# Patient Record
Sex: FEMALE | NOT HISPANIC OR LATINO | URBAN - METROPOLITAN AREA
[De-identification: names, ages, dates, MRNs, and addresses within clinical notes are randomized per-mention and may not be internally consistent; named-entity substitution may affect disease eponyms.]

---

## 2024-11-05 ENCOUNTER — APPOINTMENT (RX ONLY)
Age: 69
Setting detail: DERMATOLOGY
End: 2024-11-05

## 2024-11-05 DIAGNOSIS — D22 MELANOCYTIC NEVI: ICD-10-CM

## 2024-11-05 DIAGNOSIS — B35.1 TINEA UNGUIUM: ICD-10-CM

## 2024-11-05 DIAGNOSIS — L57.0 ACTINIC KERATOSIS: ICD-10-CM

## 2024-11-05 DIAGNOSIS — D485 NEOPLASM OF UNCERTAIN BEHAVIOR OF SKIN: ICD-10-CM

## 2024-11-05 DIAGNOSIS — D18.0 HEMANGIOMA: ICD-10-CM

## 2024-11-05 DIAGNOSIS — L82.0 INFLAMED SEBORRHEIC KERATOSIS: ICD-10-CM

## 2024-11-05 PROBLEM — D18.01 HEMANGIOMA OF SKIN AND SUBCUTANEOUS TISSUE: Status: ACTIVE | Noted: 2024-11-05

## 2024-11-05 PROBLEM — D48.5 NEOPLASM OF UNCERTAIN BEHAVIOR OF SKIN: Status: ACTIVE | Noted: 2024-11-05

## 2024-11-05 PROBLEM — D22.71 MELANOCYTIC NEVI OF RIGHT LOWER LIMB, INCLUDING HIP: Status: ACTIVE | Noted: 2024-11-05

## 2024-11-05 PROCEDURE — ? LIQUID NITROGEN

## 2024-11-05 PROCEDURE — 99203 OFFICE O/P NEW LOW 30 MIN: CPT | Mod: 25

## 2024-11-05 PROCEDURE — ? OBSERVATION

## 2024-11-05 PROCEDURE — ? COUNSELING

## 2024-11-05 PROCEDURE — 11102 TANGNTL BX SKIN SINGLE LES: CPT | Mod: 59

## 2024-11-05 PROCEDURE — 17111 DESTRUCTION B9 LESIONS 15/>: CPT

## 2024-11-05 PROCEDURE — 17000 DESTRUCT PREMALG LESION: CPT | Mod: 59

## 2024-11-05 PROCEDURE — ? BIOPSY BY SHAVE METHOD

## 2024-11-05 PROCEDURE — ? ADDITIONAL NOTES

## 2024-11-05 ASSESSMENT — LOCATION DETAILED DESCRIPTION DERM
LOCATION DETAILED: LEFT CLAVICULAR SKIN
LOCATION DETAILED: RIGHT DORSAL GREAT TOE
LOCATION DETAILED: RIGHT KNEE
LOCATION DETAILED: LEFT ANTERIOR SHOULDER
LOCATION DETAILED: LEFT LATERAL SUPERIOR CHEST
LOCATION DETAILED: LEFT MEDIAL SUPERIOR CHEST
LOCATION DETAILED: LEFT FOREHEAD
LOCATION DETAILED: LEFT POPLITEAL SKIN
LOCATION DETAILED: LEFT POSTAURICULAR SKIN
LOCATION DETAILED: RIGHT LATERAL FOREHEAD
LOCATION DETAILED: RIGHT SUPERIOR FOREHEAD
LOCATION DETAILED: RIGHT LATERAL UPPER BACK
LOCATION DETAILED: LEFT DORSAL GREAT TOE
LOCATION DETAILED: RIGHT FOREHEAD
LOCATION DETAILED: LEFT INFERIOR MEDIAL UPPER BACK
LOCATION DETAILED: LEFT ANTERIOR PROXIMAL THIGH
LOCATION DETAILED: LEFT INFERIOR TEMPLE
LOCATION DETAILED: LEFT DISTAL POSTERIOR THIGH
LOCATION DETAILED: LEFT INFERIOR FOREHEAD
LOCATION DETAILED: LEFT MID-UPPER BACK
LOCATION DETAILED: RIGHT DISTAL POSTERIOR THIGH
LOCATION DETAILED: LEFT SUPERIOR UPPER BACK
LOCATION DETAILED: RIGHT MID-UPPER BACK
LOCATION DETAILED: LEFT VENTRAL PROXIMAL FOREARM
LOCATION DETAILED: PERIUMBILICAL SKIN
LOCATION DETAILED: NASAL DORSUM
LOCATION DETAILED: RIGHT SUPERIOR LATERAL MALAR CHEEK
LOCATION DETAILED: RIGHT SUPERIOR UPPER BACK
LOCATION DETAILED: RIGHT SUPERIOR POSTERIOR NECK
LOCATION DETAILED: RIGHT MID TEMPLE
LOCATION DETAILED: LEFT INFERIOR LATERAL FOREHEAD

## 2024-11-05 ASSESSMENT — LOCATION SIMPLE DESCRIPTION DERM
LOCATION SIMPLE: RIGHT KNEE
LOCATION SIMPLE: RIGHT POSTERIOR THIGH
LOCATION SIMPLE: RIGHT TEMPLE
LOCATION SIMPLE: CHEST
LOCATION SIMPLE: ABDOMEN
LOCATION SIMPLE: POSTERIOR NECK
LOCATION SIMPLE: RIGHT CHEEK
LOCATION SIMPLE: LEFT THIGH
LOCATION SIMPLE: LEFT FOREHEAD
LOCATION SIMPLE: LEFT GREAT TOE
LOCATION SIMPLE: RIGHT UPPER BACK
LOCATION SIMPLE: LEFT FOREARM
LOCATION SIMPLE: LEFT POSTERIOR THIGH
LOCATION SIMPLE: LEFT UPPER BACK
LOCATION SIMPLE: LEFT TEMPLE
LOCATION SIMPLE: POSTERIOR SCALP
LOCATION SIMPLE: NOSE
LOCATION SIMPLE: RIGHT FOREHEAD
LOCATION SIMPLE: LEFT POPLITEAL SKIN
LOCATION SIMPLE: RIGHT GREAT TOE
LOCATION SIMPLE: LEFT SHOULDER
LOCATION SIMPLE: LEFT CLAVICULAR SKIN

## 2024-11-05 ASSESSMENT — LOCATION ZONE DERM
LOCATION ZONE: TRUNK
LOCATION ZONE: LEG
LOCATION ZONE: FACE
LOCATION ZONE: TOE
LOCATION ZONE: ARM
LOCATION ZONE: SCALP
LOCATION ZONE: NECK
LOCATION ZONE: NOSE

## 2024-11-05 NOTE — PROCEDURE: LIQUID NITROGEN
Spray Paint Text: The liquid nitrogen was applied to the skin utilizing a spray paint frosting technique.
Duration Of Freeze Thaw-Cycle (Seconds): 5-10
Post-Care Instructions: I reviewed with the patient in detail post-care instructions. Patient is to wear sunprotection, and avoid picking at any of the treated lesions. Pt may apply Vaseline to crusted or scabbing areas.
Show Spray Paint Technique Variable?: Yes
Pared With?: 15 blade
Application Tool (Optional): Liquid Nitrogen Sprayer
Add 52 Modifier (Optional): no
Medical Necessity Clause: This procedure was medically necessary because the lesions that were treated were:
Consent: The patient's consent was obtained including but not limited to risks of crusting, scabbing, blistering, scarring, darker or lighter pigmentary change, recurrence, incomplete removal and infection.
Number Of Freeze-Thaw Cycles: 3 freeze-thaw cycles
Medical Necessity Information: It is in your best interest to select a reason for this procedure from the list below. All of these items fulfill various CMS LCD requirements except the new and changing color options.
Aperture Size (Optional): C
Detail Level: Detailed
Number Of Freeze-Thaw Cycles: 1 freeze-thaw cycle
Detail Level: Simple
Duration Of Freeze Thaw-Cycle (Seconds): 0

## 2024-11-05 NOTE — PROCEDURE: ADDITIONAL NOTES
Render Risk Assessment In Note?: no
Additional Notes: -Pt reports seeing podiatrist for fungus on toenails\\n-Declines tx at today's visit
Detail Level: Zone
Additional Notes: may need additional treatment

## 2024-11-13 ENCOUNTER — APPOINTMENT (RX ONLY)
Age: 69
Setting detail: DERMATOLOGY
End: 2024-11-13

## 2024-11-13 DIAGNOSIS — Z71.89 OTHER SPECIFIED COUNSELING: ICD-10-CM

## 2024-11-13 PROBLEM — C44.311 BASAL CELL CARCINOMA OF SKIN OF NOSE: Status: ACTIVE | Noted: 2024-11-13

## 2024-11-13 PROCEDURE — 77290 THER RAD SIMULAJ FIELD CPLX: CPT

## 2024-11-13 PROCEDURE — ? ADDITIONAL NOTES

## 2024-11-13 PROCEDURE — ? SUPERFICIAL RADIOTHERAPY: SIMULATION VISIT

## 2024-11-13 PROCEDURE — ? SUPERFICIAL RADIATION TREATMENT

## 2024-11-13 PROCEDURE — 77300 RADIATION THERAPY DOSE PLAN: CPT

## 2024-11-13 NOTE — PROCEDURE: SUPERFICIAL RADIOTHERAPY: SIMULATION VISIT
Bill For Treatment Device Design (98651, 71391, 08657): No
Bill For Simulation: Yes- (Complex or 3+ Sites: 30491)
Bill For Dosimetry/Prescription Creation (51263): Yes
Patient Positioning: Sitting

## 2024-11-13 NOTE — PROCEDURE: SUPERFICIAL RADIATION TREATMENT
Computed Treatment Time In Min (Will Render The Same As Calculated Treatment Time If Left Blank): 0
Bill For Physics Consultation: Yes
Radiation Units: cGy
Prescription Used: 1
Include Rx 4 When Rendering Additional Prescriptions: No
Field Size (Applicator): 1.5 cm
Additional Prescription Justification Text: If there is any interruption in treatment exceeding 5 days please see Decay and Dose Adjustment Calculation and complete treatment under Prescription 2, 3 or 4 as appropriate.
Detail Level: Detailed
Patient Positioning: Supine
Treatment Margins In Cm: 0.5
Port Dimensions-Y Axis In Cm: 1.5
Simple Simulation Preamble Text Will Be Included With Simple Simulations (.......... Indications): Simple simulation was performed today for the following reasons:
Use Automated Fraction Number And Cumulative Dosage?: Yes (Use Enhanced Automation)
Total Rx Dosage (Required): 5472.3
Ultrasound Used Text: Ultrasound was utilized to place radiation therapy fields.
Field Number: [id number]
Fractionation Number (Evaluation): Override
Treatment Device Design After Initial Simulation Justification (Will Render If Bill For Treatment Devices = Yes): The patient is status post radiation simulation and is evaluated as to the use of additional devices for shielding and placement for radiation therapy.
Total Dose (Optional-Please Include Units): 5472.3 cGy
Ultrasound Used Text: Patient has a location which was not amenable to ultrasound.
Daily Dosage Administered For All Fractions (Required): 321.6
Assessment: Appropriate reaction
Treatment Time In Min (Optional): .44
Time Dose Fractionation (Optional- Include Units If Applicable): .44 min
Custom Shielding Afterword Text Will Not Be Included With Simple Simulations (X X Y Cm............): port to correlate with the lesion size, including treatment margin. The custom lead shield is adequate to accommodate the appropriate applicator and provide adequate shielding around the treatment site. Additional shielding (as noted below) is used to protect sensitive, normal tissues.
Energy (Optional-Please Include Units): 50 kV
Please Choose The Type Of Visit (Required): Treatment and Simulation Visit: Show Treatment/Simulation Variables
Simple Simulation Afterword Text Will Be Included With Simple Simulations (Indications............): The patient had a complete consultation regarding all applicable modalities for the treatment of their skin cancer and based on a variety of factors including the type of tumor, size, and location, the relevant medical history as well as local tissue factors, the functional status of the individual, the ability to perform necessary postoperative wound instructions and the need for simultaneous treatments as well as overall wound healing status, it was determined that the patient would begin radiation therapy treatment for skin cancer.  A full simulation and treatment device design was performed including the determination and formulation of appropriate simple and complex devices including lead shield of 0.762 mm thickness to form molded customized shielding to specifically correlate with the lesion size including treatment margin.  The custom lead shield is adequate to accommodate the appropriate applicator and provide adequate shielding around the treatment site.  The specific field applicator, shields, and devices both simple and complex as well as the specific patient setup is outlined below.  The patient was given a full consent for superficial radiation to both verbally and in writing and the full determination of patient's eligibility for treatment and selection is outlined on the patient eligibility and treatment selection form.  The specific superficial radiotherapy prescription was determined and was documented on the superficial radiotherapy prescription form.  A treatment calculation was also performed and documented on the treatment calculation form.  Based on the prescription, the patient was scheduled for a series of fractional treatments.
Total Number Of Fractions: 17
Daily Fractionated Dose (Optional- Include Units): 321.6 cGy
Fractions / Week: 2
Custom Shielding Preamble Text Will Not Be Included With Simple Simulations (.......... X X Y Cm): A lead shield of 0.762 mm thickness is utilized to form a molded, custom shield with a
Bill For Simulation: Yes - (Complex Simulation: 18653)
Body Location Override (Optional): Nasal Dorsum
Treatment Time / Fractionation (Optional- Include Units): 731 cGy/min
Intro Statement (Will Not Render If Left Blank): The patient is undergoing superficial radiation therapy for skin cancer and presents for weekly evaluation and management.  Per protocol and as documented on the flow sheet, the patient was questioned as to subjective redness, pruritus, pain, drainage, fatigue, or any other symptoms.  Objectively, the radiation area was evaluated with regards to erythema, atrophy, scale, crusting, erosion, ulceration, edema, purpura, tenderness, warmth, drainage, and any other findings.  The plan was extensively reviewed including the dose, and dosing schedule.  The simulation and clinical setup was also reviewed as was the external and any internal shields and based on this review the appropriateness and sufficiency of treatment was determined.
Other Assessment (Will Overide Current Assessment): Appropriate for SRT
Information: Selecting Yes will display possible errors in your note based on the variables you have selected. This validation is only offered as a suggestion for you. PLEASE NOTE THAT THE VALIDATION TEXT WILL BE REMOVED WHEN YOU FINALIZE YOUR NOTE. IF YOU WANT TO FAX A PRELIMINARY NOTE YOU WILL NEED TO TOGGLE THIS TO 'NO' IF YOU DO NOT WANT IT IN YOUR FAXED NOTE.
Functional Status: 0 (fully active)
Shielding Size (Optional- Include Units): x cm
Energy Output In Cgy/Min (Optional): 731
Ssd In Cm (Optional): 15

## 2024-11-13 NOTE — PROCEDURE: ADDITIONAL NOTES
Additional Notes: Medicare - I, Dr. Fernando Tran, attest that my ACGME accredited resident/fellow physician at the [facility] residency program and myself personally met with the patient face to face and examined the patient and I developed the plan of care. As the  and primary teaching physician which involves residents in providing care at [facility], I am able to provide the necessary direction, management and review for the resident’s services by direct supervision with the resident and the patient. Incident to services are being performed under the direct supervision of Dr Tran, initiation and continued involvement in treatment is always followed and he is immediately available as he is within the walls of this office during treatment to provide assistance and direction throughout the time the resident or fellow is performing services.
Render Risk Assessment In Note?: no
Detail Level: Simple

## 2024-11-15 ENCOUNTER — APPOINTMENT (RX ONLY)
Age: 69
Setting detail: DERMATOLOGY
End: 2024-11-15

## 2024-11-15 PROBLEM — C44.311 BASAL CELL CARCINOMA OF SKIN OF NOSE: Status: ACTIVE | Noted: 2024-11-15

## 2024-11-15 PROCEDURE — ? SUPERFICIAL RADIATION TREATMENT

## 2024-11-15 PROCEDURE — 77401: CPT

## 2024-11-15 PROCEDURE — 77280 THER RAD SIMULAJ FIELD SMPL: CPT

## 2024-11-15 NOTE — PROCEDURE: SUPERFICIAL RADIATION TREATMENT
Computed Treatment Time In Min (Will Render The Same As Calculated Treatment Time If Left Blank): 0
Bill For Physics Consultation: Yes
Radiation Units: cGy
Prescription Used: 1
Include Rx 4 When Rendering Additional Prescriptions: No
Field Size (Applicator): 1.5 cm
Additional Prescription Justification Text: If there is any interruption in treatment exceeding 5 days please see Decay and Dose Adjustment Calculation and complete treatment under Prescription 2, 3 or 4 as appropriate.
Detail Level: Detailed
Patient Positioning: Supine
Treatment Margins In Cm: 0.5
Date Of Fraction 1: 11/15/2024
Port Dimensions-Y Axis In Cm: 1.5
Simple Simulation Preamble Text Will Be Included With Simple Simulations (.......... Indications): Simple simulation was performed today for the following reasons:
Use Automated Fraction Number And Cumulative Dosage?: Yes (Use Enhanced Automation)
Total Rx Dosage (Required): 5472.3
Ultrasound Used Text: Ultrasound was utilized to place radiation therapy fields.
Field Number: [id number]
Fractionation Number (Evaluation): Override
Treatment Device Design After Initial Simulation Justification (Will Render If Bill For Treatment Devices = Yes): The patient is status post radiation simulation and is evaluated as to the use of additional devices for shielding and placement for radiation therapy.
Total Dose (Optional-Please Include Units): 5472.3 cGy
Ultrasound Used Text: Patient has a location which was not amenable to ultrasound.
Daily Dosage Administered For All Fractions (Required): 321.6
Assessment: Appropriate reaction
Treatment Time In Min (Optional): .44
Time Dose Fractionation (Optional- Include Units If Applicable): .44 min
Custom Shielding Afterword Text Will Not Be Included With Simple Simulations (X X Y Cm............): port to correlate with the lesion size, including treatment margin. The custom lead shield is adequate to accommodate the appropriate applicator and provide adequate shielding around the treatment site. Additional shielding (as noted below) is used to protect sensitive, normal tissues.
Energy (Optional-Please Include Units): 50 kV
Please Choose The Type Of Visit (Required): Treatment and Simulation Visit: Show Treatment/Simulation Variables
Simple Simulation Afterword Text Will Be Included With Simple Simulations (Indications............): The patient had a complete consultation regarding all applicable modalities for the treatment of their skin cancer and based on a variety of factors including the type of tumor, size, and location, the relevant medical history as well as local tissue factors, the functional status of the individual, the ability to perform necessary postoperative wound instructions and the need for simultaneous treatments as well as overall wound healing status, it was determined that the patient would begin radiation therapy treatment for skin cancer.  A full simulation and treatment device design was performed including the determination and formulation of appropriate simple and complex devices including lead shield of 0.762 mm thickness to form molded customized shielding to specifically correlate with the lesion size including treatment margin.  The custom lead shield is adequate to accommodate the appropriate applicator and provide adequate shielding around the treatment site.  The specific field applicator, shields, and devices both simple and complex as well as the specific patient setup is outlined below.  The patient was given a full consent for superficial radiation to both verbally and in writing and the full determination of patient's eligibility for treatment and selection is outlined on the patient eligibility and treatment selection form.  The specific superficial radiotherapy prescription was determined and was documented on the superficial radiotherapy prescription form.  A treatment calculation was also performed and documented on the treatment calculation form.  Based on the prescription, the patient was scheduled for a series of fractional treatments.
Total Number Of Fractions: 17
Daily Fractionated Dose (Optional- Include Units): 321.6 cGy
Fractions / Week: 2
Custom Shielding Preamble Text Will Not Be Included With Simple Simulations (.......... X X Y Cm): A lead shield of 0.762 mm thickness is utilized to form a molded, custom shield with a
Bill For Simulation: Yes - (Simple Simulation: 00215)
Body Location Override (Optional): Nasal Dorsum
Treatment Time / Fractionation (Optional- Include Units): 731 cGy/min
Intro Statement (Will Not Render If Left Blank): The patient is undergoing superficial radiation therapy for skin cancer and presents for weekly evaluation and management.  Per protocol and as documented on the flow sheet, the patient was questioned as to subjective redness, pruritus, pain, drainage, fatigue, or any other symptoms.  Objectively, the radiation area was evaluated with regards to erythema, atrophy, scale, crusting, erosion, ulceration, edema, purpura, tenderness, warmth, drainage, and any other findings.  The plan was extensively reviewed including the dose, and dosing schedule.  The simulation and clinical setup was also reviewed as was the external and any internal shields and based on this review the appropriateness and sufficiency of treatment was determined.
Other Assessment (Will Overide Current Assessment): Appropriate for SRT
Information: Selecting Yes will display possible errors in your note based on the variables you have selected. This validation is only offered as a suggestion for you. PLEASE NOTE THAT THE VALIDATION TEXT WILL BE REMOVED WHEN YOU FINALIZE YOUR NOTE. IF YOU WANT TO FAX A PRELIMINARY NOTE YOU WILL NEED TO TOGGLE THIS TO 'NO' IF YOU DO NOT WANT IT IN YOUR FAXED NOTE.
Functional Status: 0 (fully active)
Shielding Size (Optional- Include Units): x cm
Energy Output In Cgy/Min (Optional): 731
Ssd In Cm (Optional): 15

## 2024-11-18 ENCOUNTER — APPOINTMENT (RX ONLY)
Age: 69
Setting detail: DERMATOLOGY
End: 2024-11-18

## 2024-11-18 DIAGNOSIS — Z71.89 OTHER SPECIFIED COUNSELING: ICD-10-CM

## 2024-11-18 PROBLEM — C44.311 BASAL CELL CARCINOMA OF SKIN OF NOSE: Status: ACTIVE | Noted: 2024-11-18

## 2024-11-18 PROCEDURE — 77401: CPT

## 2024-11-18 PROCEDURE — ? ADDITIONAL NOTES

## 2024-11-18 PROCEDURE — 77280 THER RAD SIMULAJ FIELD SMPL: CPT

## 2024-11-18 PROCEDURE — ? SUPERFICIAL RADIATION TREATMENT

## 2024-11-18 NOTE — PROCEDURE: ADDITIONAL NOTES
Additional Notes: Medicare - I, Dr. Fernando Tran, attest that my ACGME accredited resident/fellow physician at the [facility] residency program and myself personally met with the patient face to face and examined the patient and I developed the plan of care. As the  and primary teaching physician which involves residents in providing care at [facility], I am able to provide the necessary direction, management and review for the resident’s services by direct supervision with the resident and the patient. Incident to services are being performed under the direct supervision of Dr Tran, initiation and continued involvement in treatment is always followed and he is immediately available as he is within the walls of this office during treatment to provide assistance and direction throughout the time the resident or fellow is performing services.
Detail Level: Simple
Render Risk Assessment In Note?: no

## 2024-11-18 NOTE — PROCEDURE: SUPERFICIAL RADIATION TREATMENT
Computed Treatment Time In Min (Will Render The Same As Calculated Treatment Time If Left Blank): 0
Bill For Physics Consultation: Yes
Radiation Units: cGy
Prescription Used: 1
Include Rx 4 When Rendering Additional Prescriptions: No
Field Size (Applicator): 1.5 cm
Additional Prescription Justification Text: If there is any interruption in treatment exceeding 5 days please see Decay and Dose Adjustment Calculation and complete treatment under Prescription 2, 3 or 4 as appropriate.
Detail Level: Detailed
Patient Positioning: Supine
Treatment Margins In Cm: 0.5
Date Of Fraction 1: 11/15/2024
Port Dimensions-Y Axis In Cm: 1.5
Simple Simulation Preamble Text Will Be Included With Simple Simulations (.......... Indications): Simple simulation was performed today for the following reasons:
Use Automated Fraction Number And Cumulative Dosage?: Yes (Use Enhanced Automation)
Total Rx Dosage (Required): 5472.3
Ultrasound Used Text: Ultrasound was utilized to place radiation therapy fields.
Field Number: [id number]
Fractionation Number (Evaluation): Override
Treatment Device Design After Initial Simulation Justification (Will Render If Bill For Treatment Devices = Yes): The patient is status post radiation simulation and is evaluated as to the use of additional devices for shielding and placement for radiation therapy.
Date Of Fraction 2: 11/18/2024
Total Dose (Optional-Please Include Units): 5472.3 cGy
Ultrasound Used Text: Patient has a location which was not amenable to ultrasound.
Daily Dosage Administered For All Fractions (Required): 321.6
Assessment: Appropriate reaction
Treatment Time In Min (Optional): .44
Time Dose Fractionation (Optional- Include Units If Applicable): .44 min
Custom Shielding Afterword Text Will Not Be Included With Simple Simulations (X X Y Cm............): port to correlate with the lesion size, including treatment margin. The custom lead shield is adequate to accommodate the appropriate applicator and provide adequate shielding around the treatment site. Additional shielding (as noted below) is used to protect sensitive, normal tissues.
Energy (Optional-Please Include Units): 50 kV
Please Choose The Type Of Visit (Required): Treatment and Simulation Visit: Show Treatment/Simulation Variables
Simple Simulation Afterword Text Will Be Included With Simple Simulations (Indications............): The patient had a complete consultation regarding all applicable modalities for the treatment of their skin cancer and based on a variety of factors including the type of tumor, size, and location, the relevant medical history as well as local tissue factors, the functional status of the individual, the ability to perform necessary postoperative wound instructions and the need for simultaneous treatments as well as overall wound healing status, it was determined that the patient would begin radiation therapy treatment for skin cancer.  A full simulation and treatment device design was performed including the determination and formulation of appropriate simple and complex devices including lead shield of 0.762 mm thickness to form molded customized shielding to specifically correlate with the lesion size including treatment margin.  The custom lead shield is adequate to accommodate the appropriate applicator and provide adequate shielding around the treatment site.  The specific field applicator, shields, and devices both simple and complex as well as the specific patient setup is outlined below.  The patient was given a full consent for superficial radiation to both verbally and in writing and the full determination of patient's eligibility for treatment and selection is outlined on the patient eligibility and treatment selection form.  The specific superficial radiotherapy prescription was determined and was documented on the superficial radiotherapy prescription form.  A treatment calculation was also performed and documented on the treatment calculation form.  Based on the prescription, the patient was scheduled for a series of fractional treatments.
Total Number Of Fractions: 17
Daily Fractionated Dose (Optional- Include Units): 321.6 cGy
Fractions / Week: 2
Custom Shielding Preamble Text Will Not Be Included With Simple Simulations (.......... X X Y Cm): A lead shield of 0.762 mm thickness is utilized to form a molded, custom shield with a
Bill For Simulation: Yes - (Simple Simulation: 93421)
Body Location Override (Optional): Nasal Dorsum
Treatment Time / Fractionation (Optional- Include Units): 731 cGy/min
Intro Statement (Will Not Render If Left Blank): The patient is undergoing superficial radiation therapy for skin cancer and presents for weekly evaluation and management.  Per protocol and as documented on the flow sheet, the patient was questioned as to subjective redness, pruritus, pain, drainage, fatigue, or any other symptoms.  Objectively, the radiation area was evaluated with regards to erythema, atrophy, scale, crusting, erosion, ulceration, edema, purpura, tenderness, warmth, drainage, and any other findings.  The plan was extensively reviewed including the dose, and dosing schedule.  The simulation and clinical setup was also reviewed as was the external and any internal shields and based on this review the appropriateness and sufficiency of treatment was determined.
Other Assessment (Will Overide Current Assessment): Appropriate for SRT
Information: Selecting Yes will display possible errors in your note based on the variables you have selected. This validation is only offered as a suggestion for you. PLEASE NOTE THAT THE VALIDATION TEXT WILL BE REMOVED WHEN YOU FINALIZE YOUR NOTE. IF YOU WANT TO FAX A PRELIMINARY NOTE YOU WILL NEED TO TOGGLE THIS TO 'NO' IF YOU DO NOT WANT IT IN YOUR FAXED NOTE.
Functional Status: 0 (fully active)
Shielding Size (Optional- Include Units): x cm
Energy Output In Cgy/Min (Optional): 731
Ssd In Cm (Optional): 15

## 2024-11-20 ENCOUNTER — APPOINTMENT (RX ONLY)
Age: 69
Setting detail: DERMATOLOGY
End: 2024-11-20

## 2024-11-20 DIAGNOSIS — Z71.89 OTHER SPECIFIED COUNSELING: ICD-10-CM

## 2024-11-20 PROBLEM — C44.311 BASAL CELL CARCINOMA OF SKIN OF NOSE: Status: ACTIVE | Noted: 2024-11-20

## 2024-11-20 PROCEDURE — ? ADDITIONAL NOTES

## 2024-11-20 PROCEDURE — ? SUPERFICIAL RADIATION TREATMENT

## 2024-11-20 PROCEDURE — 77280 THER RAD SIMULAJ FIELD SMPL: CPT

## 2024-11-20 PROCEDURE — 77401: CPT

## 2024-11-20 NOTE — PROCEDURE: SUPERFICIAL RADIATION TREATMENT
Computed Treatment Time In Min (Will Render The Same As Calculated Treatment Time If Left Blank): 0
Bill For Physics Consultation: Yes
Radiation Units: cGy
Prescription Used: 1
Date Of Fraction 3: 11/20/2024
Include Rx 4 When Rendering Additional Prescriptions: No
Field Size (Applicator): 1.5 cm
Additional Prescription Justification Text: If there is any interruption in treatment exceeding 5 days please see Decay and Dose Adjustment Calculation and complete treatment under Prescription 2, 3 or 4 as appropriate.
Detail Level: Detailed
Patient Positioning: Supine
Treatment Margins In Cm: 0.5
Date Of Fraction 1: 11/15/2024
Port Dimensions-Y Axis In Cm: 1.5
Simple Simulation Preamble Text Will Be Included With Simple Simulations (.......... Indications): Simple simulation was performed today for the following reasons:
Use Automated Fraction Number And Cumulative Dosage?: Yes (Use Enhanced Automation)
Total Rx Dosage (Required): 5472.3
Ultrasound Used Text: Ultrasound was utilized to place radiation therapy fields.
Field Number: [id number]
Fractionation Number (Evaluation): Override
Treatment Device Design After Initial Simulation Justification (Will Render If Bill For Treatment Devices = Yes): The patient is status post radiation simulation and is evaluated as to the use of additional devices for shielding and placement for radiation therapy.
Date Of Fraction 2: 11/18/2024
Total Dose (Optional-Please Include Units): 5472.3 cGy
Ultrasound Used Text: Patient has a location which was not amenable to ultrasound.
Daily Dosage Administered For All Fractions (Required): 321.6
Assessment: Appropriate reaction
Treatment Time In Min (Optional): .44
Time Dose Fractionation (Optional- Include Units If Applicable): .44 min
Custom Shielding Afterword Text Will Not Be Included With Simple Simulations (X X Y Cm............): port to correlate with the lesion size, including treatment margin. The custom lead shield is adequate to accommodate the appropriate applicator and provide adequate shielding around the treatment site. Additional shielding (as noted below) is used to protect sensitive, normal tissues.
Energy (Optional-Please Include Units): 50 kV
Please Choose The Type Of Visit (Required): Treatment and Simulation Visit: Show Treatment/Simulation Variables
Simple Simulation Afterword Text Will Be Included With Simple Simulations (Indications............): The patient had a complete consultation regarding all applicable modalities for the treatment of their skin cancer and based on a variety of factors including the type of tumor, size, and location, the relevant medical history as well as local tissue factors, the functional status of the individual, the ability to perform necessary postoperative wound instructions and the need for simultaneous treatments as well as overall wound healing status, it was determined that the patient would begin radiation therapy treatment for skin cancer.  A full simulation and treatment device design was performed including the determination and formulation of appropriate simple and complex devices including lead shield of 0.762 mm thickness to form molded customized shielding to specifically correlate with the lesion size including treatment margin.  The custom lead shield is adequate to accommodate the appropriate applicator and provide adequate shielding around the treatment site.  The specific field applicator, shields, and devices both simple and complex as well as the specific patient setup is outlined below.  The patient was given a full consent for superficial radiation to both verbally and in writing and the full determination of patient's eligibility for treatment and selection is outlined on the patient eligibility and treatment selection form.  The specific superficial radiotherapy prescription was determined and was documented on the superficial radiotherapy prescription form.  A treatment calculation was also performed and documented on the treatment calculation form.  Based on the prescription, the patient was scheduled for a series of fractional treatments.
Total Number Of Fractions: 17
Daily Fractionated Dose (Optional- Include Units): 321.6 cGy
Fractions / Week: 2
Custom Shielding Preamble Text Will Not Be Included With Simple Simulations (.......... X X Y Cm): A lead shield of 0.762 mm thickness is utilized to form a molded, custom shield with a
Bill For Simulation: Yes - (Simple Simulation: 27979)
Body Location Override (Optional): Nasal Dorsum
Treatment Time / Fractionation (Optional- Include Units): 731 cGy/min
Intro Statement (Will Not Render If Left Blank): The patient is undergoing superficial radiation therapy for skin cancer and presents for weekly evaluation and management.  Per protocol and as documented on the flow sheet, the patient was questioned as to subjective redness, pruritus, pain, drainage, fatigue, or any other symptoms.  Objectively, the radiation area was evaluated with regards to erythema, atrophy, scale, crusting, erosion, ulceration, edema, purpura, tenderness, warmth, drainage, and any other findings.  The plan was extensively reviewed including the dose, and dosing schedule.  The simulation and clinical setup was also reviewed as was the external and any internal shields and based on this review the appropriateness and sufficiency of treatment was determined.
Other Assessment (Will Overide Current Assessment): Appropriate for SRT
Information: Selecting Yes will display possible errors in your note based on the variables you have selected. This validation is only offered as a suggestion for you. PLEASE NOTE THAT THE VALIDATION TEXT WILL BE REMOVED WHEN YOU FINALIZE YOUR NOTE. IF YOU WANT TO FAX A PRELIMINARY NOTE YOU WILL NEED TO TOGGLE THIS TO 'NO' IF YOU DO NOT WANT IT IN YOUR FAXED NOTE.
Functional Status: 0 (fully active)
Shielding Size (Optional- Include Units): x cm
Energy Output In Cgy/Min (Optional): 731
Ssd In Cm (Optional): 15

## 2024-11-20 NOTE — PROCEDURE: ADDITIONAL NOTES
Additional Notes: Medicare - I, Dr. Fernando Tran, attest that my ACG[employer] accredited resident/fellow physician at the [facility] residency program and myself personally met with the patient face to face and examined the patient and I developed the plan of care. As the  and primary teaching physician which involves residents in providing care at [facility], I am able to provide the necessary direction, management and review for the resident’s services by direct supervision with the resident and the patient. Incident to services are being performed under the direct supervision of Dr Tran, initiation and continued involvement in treatment is always followed and he is immediately available as he is within the walls of this office during treatment to provide assistance and direction throughout the time the resident or fellow is performing services.
Detail Level: Simple
Render Risk Assessment In Note?: no

## 2024-11-25 ENCOUNTER — APPOINTMENT (RX ONLY)
Age: 69
Setting detail: DERMATOLOGY
End: 2024-11-25

## 2024-11-25 DIAGNOSIS — Z71.89 OTHER SPECIFIED COUNSELING: ICD-10-CM

## 2024-11-25 PROBLEM — C44.311 BASAL CELL CARCINOMA OF SKIN OF NOSE: Status: ACTIVE | Noted: 2024-11-25

## 2024-11-25 PROCEDURE — 77401: CPT

## 2024-11-25 PROCEDURE — 77280 THER RAD SIMULAJ FIELD SMPL: CPT

## 2024-11-25 PROCEDURE — ? SUPERFICIAL RADIATION TREATMENT

## 2024-11-25 PROCEDURE — ? ADDITIONAL NOTES

## 2024-11-25 NOTE — PROCEDURE: ADDITIONAL NOTES
Additional Notes: Medicare - I, Dr. David Benson, attest that my ACGME accredited resident/fellow physician at the [facility] residency program and myself personally met with the patient face to face and examined the patient and I developed the plan of care. As a teaching physician which involves residents in providing care at [facility], I am able to provide the necessary direction, management and review for the resident’s services by direct supervision with the resident and the patient. Incident to services are being performed under the direct supervision of Dr. Benson, initiation and continued involvement in treatment is always followed and he is immediately available as he is within the walls of this office during treatment to provide assistance and direction throughout the time the resident or fellow is performing services.
Detail Level: Generalized
Render Risk Assessment In Note?: no

## 2024-11-25 NOTE — PROCEDURE: SUPERFICIAL RADIATION TREATMENT
Computed Treatment Time In Min (Will Render The Same As Calculated Treatment Time If Left Blank): 0
Bill For Physics Consultation: Yes
Radiation Units: cGy
Prescription Used: 1
Date Of Fraction 3: 11/20/2024
Include Rx 4 When Rendering Additional Prescriptions: No
Field Size (Applicator): 1.5 cm
Additional Prescription Justification Text: If there is any interruption in treatment exceeding 5 days please see Decay and Dose Adjustment Calculation and complete treatment under Prescription 2, 3 or 4 as appropriate.
Detail Level: Detailed
Patient Positioning: Supine
Treatment Margins In Cm: 0.5
Date Of Fraction 1: 11/15/2024
Port Dimensions-Y Axis In Cm: 1.5
Simple Simulation Preamble Text Will Be Included With Simple Simulations (.......... Indications): Simple simulation was performed today for the following reasons:
Use Automated Fraction Number And Cumulative Dosage?: Yes (Use Enhanced Automation)
Total Rx Dosage (Required): 5472.3
Ultrasound Used Text: Ultrasound was utilized to place radiation therapy fields.
Field Number: [id number]
Fractionation Number (Evaluation): Override
Treatment Device Design After Initial Simulation Justification (Will Render If Bill For Treatment Devices = Yes): The patient is status post radiation simulation and is evaluated as to the use of additional devices for shielding and placement for radiation therapy.
Date Of Fraction 2: 11/18/2024
Date Of Fraction 4: 11/25/2024
Total Dose (Optional-Please Include Units): 5472.3 cGy
Ultrasound Used Text: Patient has a location which was not amenable to ultrasound.
Daily Dosage Administered For All Fractions (Required): 321.6
Assessment: Appropriate reaction
Treatment Time In Min (Optional): .44
Time Dose Fractionation (Optional- Include Units If Applicable): .44 min
Custom Shielding Afterword Text Will Not Be Included With Simple Simulations (X X Y Cm............): port to correlate with the lesion size, including treatment margin. The custom lead shield is adequate to accommodate the appropriate applicator and provide adequate shielding around the treatment site. Additional shielding (as noted below) is used to protect sensitive, normal tissues.
Energy (Optional-Please Include Units): 50 kV
Please Choose The Type Of Visit (Required): Treatment and Simulation Visit: Show Treatment/Simulation Variables
Simple Simulation Afterword Text Will Be Included With Simple Simulations (Indications............): The patient had a complete consultation regarding all applicable modalities for the treatment of their skin cancer and based on a variety of factors including the type of tumor, size, and location, the relevant medical history as well as local tissue factors, the functional status of the individual, the ability to perform necessary postoperative wound instructions and the need for simultaneous treatments as well as overall wound healing status, it was determined that the patient would begin radiation therapy treatment for skin cancer.  A full simulation and treatment device design was performed including the determination and formulation of appropriate simple and complex devices including lead shield of 0.762 mm thickness to form molded customized shielding to specifically correlate with the lesion size including treatment margin.  The custom lead shield is adequate to accommodate the appropriate applicator and provide adequate shielding around the treatment site.  The specific field applicator, shields, and devices both simple and complex as well as the specific patient setup is outlined below.  The patient was given a full consent for superficial radiation to both verbally and in writing and the full determination of patient's eligibility for treatment and selection is outlined on the patient eligibility and treatment selection form.  The specific superficial radiotherapy prescription was determined and was documented on the superficial radiotherapy prescription form.  A treatment calculation was also performed and documented on the treatment calculation form.  Based on the prescription, the patient was scheduled for a series of fractional treatments.
Total Number Of Fractions: 17
Daily Fractionated Dose (Optional- Include Units): 321.6 cGy
Fractions / Week: 2
Custom Shielding Preamble Text Will Not Be Included With Simple Simulations (.......... X X Y Cm): A lead shield of 0.762 mm thickness is utilized to form a molded, custom shield with a
Bill For Simulation: Yes - (Simple Simulation: 33107)
Body Location Override (Optional): Nasal Dorsum
Treatment Time / Fractionation (Optional- Include Units): 731 cGy/min
Intro Statement (Will Not Render If Left Blank): The patient is undergoing superficial radiation therapy for skin cancer and presents for weekly evaluation and management.  Per protocol and as documented on the flow sheet, the patient was questioned as to subjective redness, pruritus, pain, drainage, fatigue, or any other symptoms.  Objectively, the radiation area was evaluated with regards to erythema, atrophy, scale, crusting, erosion, ulceration, edema, purpura, tenderness, warmth, drainage, and any other findings.  The plan was extensively reviewed including the dose, and dosing schedule.  The simulation and clinical setup was also reviewed as was the external and any internal shields and based on this review the appropriateness and sufficiency of treatment was determined.
Other Assessment (Will Overide Current Assessment): Appropriate for SRT
Information: Selecting Yes will display possible errors in your note based on the variables you have selected. This validation is only offered as a suggestion for you. PLEASE NOTE THAT THE VALIDATION TEXT WILL BE REMOVED WHEN YOU FINALIZE YOUR NOTE. IF YOU WANT TO FAX A PRELIMINARY NOTE YOU WILL NEED TO TOGGLE THIS TO 'NO' IF YOU DO NOT WANT IT IN YOUR FAXED NOTE.
Functional Status: 0 (fully active)
Shielding Size (Optional- Include Units): x cm
Energy Output In Cgy/Min (Optional): 731
Ssd In Cm (Optional): 15

## 2024-11-27 ENCOUNTER — APPOINTMENT (RX ONLY)
Age: 69
Setting detail: DERMATOLOGY
End: 2024-11-27

## 2024-11-27 DIAGNOSIS — Z71.89 OTHER SPECIFIED COUNSELING: ICD-10-CM

## 2024-11-27 PROBLEM — C44.311 BASAL CELL CARCINOMA OF SKIN OF NOSE: Status: ACTIVE | Noted: 2024-11-27

## 2024-11-27 PROCEDURE — ? ADDITIONAL NOTES

## 2024-11-27 PROCEDURE — 77280 THER RAD SIMULAJ FIELD SMPL: CPT

## 2024-11-27 PROCEDURE — 77401: CPT

## 2024-11-27 PROCEDURE — ? SUPERFICIAL RADIATION TREATMENT

## 2024-11-27 NOTE — PROCEDURE: SUPERFICIAL RADIATION TREATMENT
Date Of Fraction 5: 11/27/2024
Computed Treatment Time In Min (Will Render The Same As Calculated Treatment Time If Left Blank): 0
Bill For Physics Consultation: Yes
Radiation Units: cGy
Prescription Used: 1
Date Of Fraction 3: 11/20/2024
Include Rx 4 When Rendering Additional Prescriptions: No
Field Size (Applicator): 1.5 cm
Additional Prescription Justification Text: If there is any interruption in treatment exceeding 5 days please see Decay and Dose Adjustment Calculation and complete treatment under Prescription 2, 3 or 4 as appropriate.
Detail Level: Detailed
Patient Positioning: Supine
Treatment Margins In Cm: 0.5
Date Of Fraction 1: 11/15/2024
Port Dimensions-Y Axis In Cm: 1.5
Simple Simulation Preamble Text Will Be Included With Simple Simulations (.......... Indications): Simple simulation was performed today for the following reasons:
Use Automated Fraction Number And Cumulative Dosage?: Yes (Use Enhanced Automation)
Total Rx Dosage (Required): 5472.3
Ultrasound Used Text: Ultrasound was utilized to place radiation therapy fields.
Field Number: [id number]
Fractionation Number (Evaluation): Override
Treatment Device Design After Initial Simulation Justification (Will Render If Bill For Treatment Devices = Yes): The patient is status post radiation simulation and is evaluated as to the use of additional devices for shielding and placement for radiation therapy.
Date Of Fraction 2: 11/18/2024
Date Of Fraction 4: 11/25/2024
Total Dose (Optional-Please Include Units): 5472.3 cGy
Ultrasound Used Text: Patient has a location which was not amenable to ultrasound.
Daily Dosage Administered For All Fractions (Required): 321.6
Assessment: Appropriate reaction
Treatment Time In Min (Optional): .44
Time Dose Fractionation (Optional- Include Units If Applicable): .44 min
Custom Shielding Afterword Text Will Not Be Included With Simple Simulations (X X Y Cm............): port to correlate with the lesion size, including treatment margin. The custom lead shield is adequate to accommodate the appropriate applicator and provide adequate shielding around the treatment site. Additional shielding (as noted below) is used to protect sensitive, normal tissues.
Energy (Optional-Please Include Units): 50 kV
Please Choose The Type Of Visit (Required): Treatment and Simulation Visit: Show Treatment/Simulation Variables
Simple Simulation Afterword Text Will Be Included With Simple Simulations (Indications............): The patient had a complete consultation regarding all applicable modalities for the treatment of their skin cancer and based on a variety of factors including the type of tumor, size, and location, the relevant medical history as well as local tissue factors, the functional status of the individual, the ability to perform necessary postoperative wound instructions and the need for simultaneous treatments as well as overall wound healing status, it was determined that the patient would begin radiation therapy treatment for skin cancer.  A full simulation and treatment device design was performed including the determination and formulation of appropriate simple and complex devices including lead shield of 0.762 mm thickness to form molded customized shielding to specifically correlate with the lesion size including treatment margin.  The custom lead shield is adequate to accommodate the appropriate applicator and provide adequate shielding around the treatment site.  The specific field applicator, shields, and devices both simple and complex as well as the specific patient setup is outlined below.  The patient was given a full consent for superficial radiation to both verbally and in writing and the full determination of patient's eligibility for treatment and selection is outlined on the patient eligibility and treatment selection form.  The specific superficial radiotherapy prescription was determined and was documented on the superficial radiotherapy prescription form.  A treatment calculation was also performed and documented on the treatment calculation form.  Based on the prescription, the patient was scheduled for a series of fractional treatments.
Total Number Of Fractions: 17
Daily Fractionated Dose (Optional- Include Units): 321.6 cGy
Fractions / Week: 2
Custom Shielding Preamble Text Will Not Be Included With Simple Simulations (.......... X X Y Cm): A lead shield of 0.762 mm thickness is utilized to form a molded, custom shield with a
Bill For Simulation: Yes - (Simple Simulation: 25035)
Body Location Override (Optional): Nasal Dorsum
Treatment Time / Fractionation (Optional- Include Units): 731 cGy/min
Intro Statement (Will Not Render If Left Blank): The patient is undergoing superficial radiation therapy for skin cancer and presents for weekly evaluation and management.  Per protocol and as documented on the flow sheet, the patient was questioned as to subjective redness, pruritus, pain, drainage, fatigue, or any other symptoms.  Objectively, the radiation area was evaluated with regards to erythema, atrophy, scale, crusting, erosion, ulceration, edema, purpura, tenderness, warmth, drainage, and any other findings.  The plan was extensively reviewed including the dose, and dosing schedule.  The simulation and clinical setup was also reviewed as was the external and any internal shields and based on this review the appropriateness and sufficiency of treatment was determined.
Other Assessment (Will Overide Current Assessment): Appropriate for SRT
Information: Selecting Yes will display possible errors in your note based on the variables you have selected. This validation is only offered as a suggestion for you. PLEASE NOTE THAT THE VALIDATION TEXT WILL BE REMOVED WHEN YOU FINALIZE YOUR NOTE. IF YOU WANT TO FAX A PRELIMINARY NOTE YOU WILL NEED TO TOGGLE THIS TO 'NO' IF YOU DO NOT WANT IT IN YOUR FAXED NOTE.
Functional Status: 0 (fully active)
Shielding Size (Optional- Include Units): x cm
Energy Output In Cgy/Min (Optional): 731
Ssd In Cm (Optional): 15

## 2024-11-27 NOTE — PROCEDURE: ADDITIONAL NOTES
Additional Notes: Medicare - I, Dr. David Benson, attest that my ACGME accredited resident/fellow physician at the [facility] residency program and myself personally met with the patient face to face and examined the patient and I developed the plan of care. As a teaching physician which involves residents in providing care at [facility], I am able to provide the necessary direction, management and review for the resident’s services by direct supervision with the resident and the patient. Incident to services are being performed under the direct supervision of Dr. Benson, initiation and continued involvement in treatment is always followed and he is immediately available as he is within the walls of this office during treatment to provide assistance and direction throughout the time the resident or fellow is performing services.
Detail Level: Generalized
Render Risk Assessment In Note?: no

## 2024-12-03 ENCOUNTER — APPOINTMENT (OUTPATIENT)
Age: 69
Setting detail: DERMATOLOGY
End: 2024-12-03

## 2024-12-03 DIAGNOSIS — Z71.89 OTHER SPECIFIED COUNSELING: ICD-10-CM

## 2024-12-03 PROBLEM — C44.311 BASAL CELL CARCINOMA OF SKIN OF NOSE: Status: ACTIVE | Noted: 2024-12-03

## 2024-12-03 PROCEDURE — ? ADDITIONAL NOTES

## 2024-12-03 PROCEDURE — 77280 THER RAD SIMULAJ FIELD SMPL: CPT

## 2024-12-03 PROCEDURE — 77401: CPT

## 2024-12-03 PROCEDURE — ? SUPERFICIAL RADIATION TREATMENT

## 2024-12-03 NOTE — PROCEDURE: ADDITIONAL NOTES
Render Risk Assessment In Note?: no
Detail Level: Simple
Additional Notes: Medicare - I, Dr. Fernando Tran, attest that my [employer]CG[employer] accredited resident/fellow physician at the [facility] residency program and myself personally met with the patient face to face and examined the patient and I developed the plan of care. As the  and primary teaching physician which involves residents in providing care at [facility], I am able to provide the necessary direction, management and review for the resident’s services by direct supervision with the resident and the patient. Incident to services are being performed under the direct supervision of Dr Tran, initiation and continued involvement in treatment is always followed and he is immediately available as he is within the walls of this office during treatment to provide assistance and direction throughout the time the resident or fellow is performing services.

## 2024-12-03 NOTE — PROCEDURE: SUPERFICIAL RADIATION TREATMENT
Date Of Fraction 5: 11/27/2024
Computed Treatment Time In Min (Will Render The Same As Calculated Treatment Time If Left Blank): 0
Bill For Physics Consultation: Yes
Radiation Units: cGy
Prescription Used: 1
Date Of Fraction 3: 11/20/2024
Include Rx 4 When Rendering Additional Prescriptions: No
Field Size (Applicator): 1.5 cm
Additional Prescription Justification Text: If there is any interruption in treatment exceeding 5 days please see Decay and Dose Adjustment Calculation and complete treatment under Prescription 2, 3 or 4 as appropriate.
Detail Level: Detailed
Patient Positioning: Supine
Treatment Margins In Cm: 0.5
Date Of Fraction 1: 11/15/2024
Port Dimensions-Y Axis In Cm: 1.5
Simple Simulation Preamble Text Will Be Included With Simple Simulations (.......... Indications): Simple simulation was performed today for the following reasons:
Use Automated Fraction Number And Cumulative Dosage?: Yes (Use Enhanced Automation)
Total Rx Dosage (Required): 5472.3
Ultrasound Used Text: Ultrasound was utilized to place radiation therapy fields.
Field Number: [id number]
Fractionation Number (Evaluation): Override
Treatment Device Design After Initial Simulation Justification (Will Render If Bill For Treatment Devices = Yes): The patient is status post radiation simulation and is evaluated as to the use of additional devices for shielding and placement for radiation therapy.
Date Of Fraction 2: 11/18/2024
Date Of Fraction 4: 11/25/2024
Total Dose (Optional-Please Include Units): 5472.3 cGy
Ultrasound Used Text: Patient has a location which was not amenable to ultrasound.
Daily Dosage Administered For All Fractions (Required): 321.6
Assessment: Appropriate reaction
Treatment Time In Min (Optional): .44
Time Dose Fractionation (Optional- Include Units If Applicable): .44 min
Custom Shielding Afterword Text Will Not Be Included With Simple Simulations (X X Y Cm............): port to correlate with the lesion size, including treatment margin. The custom lead shield is adequate to accommodate the appropriate applicator and provide adequate shielding around the treatment site. Additional shielding (as noted below) is used to protect sensitive, normal tissues.
Energy (Optional-Please Include Units): 50 kV
Please Choose The Type Of Visit (Required): Treatment and Simulation Visit: Show Treatment/Simulation Variables
Simple Simulation Afterword Text Will Be Included With Simple Simulations (Indications............): The patient had a complete consultation regarding all applicable modalities for the treatment of their skin cancer and based on a variety of factors including the type of tumor, size, and location, the relevant medical history as well as local tissue factors, the functional status of the individual, the ability to perform necessary postoperative wound instructions and the need for simultaneous treatments as well as overall wound healing status, it was determined that the patient would begin radiation therapy treatment for skin cancer.  A full simulation and treatment device design was performed including the determination and formulation of appropriate simple and complex devices including lead shield of 0.762 mm thickness to form molded customized shielding to specifically correlate with the lesion size including treatment margin.  The custom lead shield is adequate to accommodate the appropriate applicator and provide adequate shielding around the treatment site.  The specific field applicator, shields, and devices both simple and complex as well as the specific patient setup is outlined below.  The patient was given a full consent for superficial radiation to both verbally and in writing and the full determination of patient's eligibility for treatment and selection is outlined on the patient eligibility and treatment selection form.  The specific superficial radiotherapy prescription was determined and was documented on the superficial radiotherapy prescription form.  A treatment calculation was also performed and documented on the treatment calculation form.  Based on the prescription, the patient was scheduled for a series of fractional treatments.
Total Number Of Fractions: 17
Daily Fractionated Dose (Optional- Include Units): 321.6 cGy
Fractions / Week: 2
Custom Shielding Preamble Text Will Not Be Included With Simple Simulations (.......... X X Y Cm): A lead shield of 0.762 mm thickness is utilized to form a molded, custom shield with a
Bill For Simulation: Yes - (Simple Simulation: 49754)
Body Location Override (Optional): Nasal Dorsum
Treatment Time / Fractionation (Optional- Include Units): 731 cGy/min
Intro Statement (Will Not Render If Left Blank): The patient is undergoing superficial radiation therapy for skin cancer and presents for weekly evaluation and management.  Per protocol and as documented on the flow sheet, the patient was questioned as to subjective redness, pruritus, pain, drainage, fatigue, or any other symptoms.  Objectively, the radiation area was evaluated with regards to erythema, atrophy, scale, crusting, erosion, ulceration, edema, purpura, tenderness, warmth, drainage, and any other findings.  The plan was extensively reviewed including the dose, and dosing schedule.  The simulation and clinical setup was also reviewed as was the external and any internal shields and based on this review the appropriateness and sufficiency of treatment was determined.
Other Assessment (Will Overide Current Assessment): Appropriate for SRT
Information: Selecting Yes will display possible errors in your note based on the variables you have selected. This validation is only offered as a suggestion for you. PLEASE NOTE THAT THE VALIDATION TEXT WILL BE REMOVED WHEN YOU FINALIZE YOUR NOTE. IF YOU WANT TO FAX A PRELIMINARY NOTE YOU WILL NEED TO TOGGLE THIS TO 'NO' IF YOU DO NOT WANT IT IN YOUR FAXED NOTE.
Functional Status: 0 (fully active)
Shielding Size (Optional- Include Units): x cm
Energy Output In Cgy/Min (Optional): 731
Ssd In Cm (Optional): 15
Date Of Fraction 6: 12/03/2024

## 2024-12-05 ENCOUNTER — APPOINTMENT (OUTPATIENT)
Age: 69
Setting detail: DERMATOLOGY
End: 2024-12-05

## 2024-12-05 DIAGNOSIS — Z71.89 OTHER SPECIFIED COUNSELING: ICD-10-CM

## 2024-12-05 PROBLEM — C44.311 BASAL CELL CARCINOMA OF SKIN OF NOSE: Status: ACTIVE | Noted: 2024-12-05

## 2024-12-05 PROCEDURE — ? ADDITIONAL NOTES

## 2024-12-05 PROCEDURE — 77401: CPT

## 2024-12-05 PROCEDURE — 77280 THER RAD SIMULAJ FIELD SMPL: CPT

## 2024-12-05 PROCEDURE — ? SUPERFICIAL RADIATION TREATMENT

## 2024-12-05 NOTE — PROCEDURE: SUPERFICIAL RADIATION TREATMENT
Date Of Fraction 5: 11/27/2024
Computed Treatment Time In Min (Will Render The Same As Calculated Treatment Time If Left Blank): 0
Bill For Physics Consultation: Yes
Radiation Units: cGy
Prescription Used: 1
Date Of Fraction 3: 11/20/2024
Include Rx 4 When Rendering Additional Prescriptions: No
Field Size (Applicator): 1.5 cm
Additional Prescription Justification Text: If there is any interruption in treatment exceeding 5 days please see Decay and Dose Adjustment Calculation and complete treatment under Prescription 2, 3 or 4 as appropriate.
Detail Level: Detailed
Patient Positioning: Supine
Treatment Margins In Cm: 0.5
Date Of Fraction 1: 11/15/2024
Port Dimensions-Y Axis In Cm: 1.5
Simple Simulation Preamble Text Will Be Included With Simple Simulations (.......... Indications): Simple simulation was performed today for the following reasons:
Use Automated Fraction Number And Cumulative Dosage?: Yes (Use Enhanced Automation)
Total Rx Dosage (Required): 5472.3
Ultrasound Used Text: Ultrasound was utilized to place radiation therapy fields.
Field Number: [id number]
Fractionation Number (Evaluation): Override
Treatment Device Design After Initial Simulation Justification (Will Render If Bill For Treatment Devices = Yes): The patient is status post radiation simulation and is evaluated as to the use of additional devices for shielding and placement for radiation therapy.
Date Of Fraction 2: 11/18/2024
Date Of Fraction 4: 11/25/2024
Total Dose (Optional-Please Include Units): 5472.3 cGy
Ultrasound Used Text: Patient has a location which was not amenable to ultrasound.
Daily Dosage Administered For All Fractions (Required): 321.6
Assessment: Appropriate reaction
Treatment Time In Min (Optional): .44
Time Dose Fractionation (Optional- Include Units If Applicable): .44 min
Custom Shielding Afterword Text Will Not Be Included With Simple Simulations (X X Y Cm............): port to correlate with the lesion size, including treatment margin. The custom lead shield is adequate to accommodate the appropriate applicator and provide adequate shielding around the treatment site. Additional shielding (as noted below) is used to protect sensitive, normal tissues.
Energy (Optional-Please Include Units): 50 kV
Please Choose The Type Of Visit (Required): Treatment and Simulation Visit: Show Treatment/Simulation Variables
Simple Simulation Afterword Text Will Be Included With Simple Simulations (Indications............): The patient had a complete consultation regarding all applicable modalities for the treatment of their skin cancer and based on a variety of factors including the type of tumor, size, and location, the relevant medical history as well as local tissue factors, the functional status of the individual, the ability to perform necessary postoperative wound instructions and the need for simultaneous treatments as well as overall wound healing status, it was determined that the patient would begin radiation therapy treatment for skin cancer.  A full simulation and treatment device design was performed including the determination and formulation of appropriate simple and complex devices including lead shield of 0.762 mm thickness to form molded customized shielding to specifically correlate with the lesion size including treatment margin.  The custom lead shield is adequate to accommodate the appropriate applicator and provide adequate shielding around the treatment site.  The specific field applicator, shields, and devices both simple and complex as well as the specific patient setup is outlined below.  The patient was given a full consent for superficial radiation to both verbally and in writing and the full determination of patient's eligibility for treatment and selection is outlined on the patient eligibility and treatment selection form.  The specific superficial radiotherapy prescription was determined and was documented on the superficial radiotherapy prescription form.  A treatment calculation was also performed and documented on the treatment calculation form.  Based on the prescription, the patient was scheduled for a series of fractional treatments.
Total Number Of Fractions: 17
Daily Fractionated Dose (Optional- Include Units): 321.6 cGy
Fractions / Week: 2
Date Of Fraction 7: 12/05/2024
Custom Shielding Preamble Text Will Not Be Included With Simple Simulations (.......... X X Y Cm): A lead shield of 0.762 mm thickness is utilized to form a molded, custom shield with a
Bill For Simulation: Yes - (Simple Simulation: 34937)
Body Location Override (Optional): Nasal Dorsum
Treatment Time / Fractionation (Optional- Include Units): 731 cGy/min
Intro Statement (Will Not Render If Left Blank): The patient is undergoing superficial radiation therapy for skin cancer and presents for weekly evaluation and management.  Per protocol and as documented on the flow sheet, the patient was questioned as to subjective redness, pruritus, pain, drainage, fatigue, or any other symptoms.  Objectively, the radiation area was evaluated with regards to erythema, atrophy, scale, crusting, erosion, ulceration, edema, purpura, tenderness, warmth, drainage, and any other findings.  The plan was extensively reviewed including the dose, and dosing schedule.  The simulation and clinical setup was also reviewed as was the external and any internal shields and based on this review the appropriateness and sufficiency of treatment was determined.
Other Assessment (Will Overide Current Assessment): Appropriate for SRT
Information: Selecting Yes will display possible errors in your note based on the variables you have selected. This validation is only offered as a suggestion for you. PLEASE NOTE THAT THE VALIDATION TEXT WILL BE REMOVED WHEN YOU FINALIZE YOUR NOTE. IF YOU WANT TO FAX A PRELIMINARY NOTE YOU WILL NEED TO TOGGLE THIS TO 'NO' IF YOU DO NOT WANT IT IN YOUR FAXED NOTE.
Functional Status: 0 (fully active)
Shielding Size (Optional- Include Units): x cm
Energy Output In Cgy/Min (Optional): 731
Ssd In Cm (Optional): 15
Date Of Fraction 6: 12/03/2024

## 2024-12-05 NOTE — PROCEDURE: ADDITIONAL NOTES
Render Risk Assessment In Note?: no
Detail Level: Simple
Additional Notes: Medicare - I, Dr. Fernando Tran, attest that my ACGME accredited resident/fellow physician at the [facility] residency program and myself personally met with the patient face to face and examined the patient and I developed the plan of care. As the  and primary teaching physician which involves residents in providing care at [facility], I am able to provide the necessary direction, management and review for the resident’s services by direct supervision with the resident and the patient. Incident to services are being performed under the direct supervision of Dr Tran, initiation and continued involvement in treatment is always followed and he is immediately available as he is within the walls of this office during treatment to provide assistance and direction throughout the time the resident or fellow is performing services.

## 2024-12-10 ENCOUNTER — APPOINTMENT (OUTPATIENT)
Age: 69
Setting detail: DERMATOLOGY
End: 2024-12-10

## 2024-12-10 DIAGNOSIS — Z71.89 OTHER SPECIFIED COUNSELING: ICD-10-CM

## 2024-12-10 PROBLEM — C44.311 BASAL CELL CARCINOMA OF SKIN OF NOSE: Status: ACTIVE | Noted: 2024-12-10

## 2024-12-10 PROCEDURE — ? ADDITIONAL NOTES

## 2024-12-10 PROCEDURE — 77401: CPT

## 2024-12-10 PROCEDURE — 77280 THER RAD SIMULAJ FIELD SMPL: CPT

## 2024-12-10 PROCEDURE — ? SUPERFICIAL RADIATION TREATMENT

## 2024-12-10 NOTE — PROCEDURE: SUPERFICIAL RADIATION TREATMENT
Date Of Fraction 5: 11/27/2024
Computed Treatment Time In Min (Will Render The Same As Calculated Treatment Time If Left Blank): 0
Bill For Physics Consultation: Yes
Radiation Units: cGy
Prescription Used: 1
Date Of Fraction 3: 11/20/2024
Include Rx 4 When Rendering Additional Prescriptions: No
Field Size (Applicator): 1.5 cm
Additional Prescription Justification Text: If there is any interruption in treatment exceeding 5 days please see Decay and Dose Adjustment Calculation and complete treatment under Prescription 2, 3 or 4 as appropriate.
Detail Level: Detailed
Patient Positioning: Supine
Treatment Margins In Cm: 0.5
Date Of Fraction 1: 11/15/2024
Port Dimensions-Y Axis In Cm: 1.5
Simple Simulation Preamble Text Will Be Included With Simple Simulations (.......... Indications): Simple simulation was performed today for the following reasons:
Use Automated Fraction Number And Cumulative Dosage?: Yes (Use Enhanced Automation)
Total Rx Dosage (Required): 5472.3
Ultrasound Used Text: Ultrasound was utilized to place radiation therapy fields.
Field Number: [id number]
Fractionation Number (Evaluation): Override
Treatment Device Design After Initial Simulation Justification (Will Render If Bill For Treatment Devices = Yes): The patient is status post radiation simulation and is evaluated as to the use of additional devices for shielding and placement for radiation therapy.
Date Of Fraction 8: 12/10/2024
Date Of Fraction 2: 11/18/2024
Date Of Fraction 4: 11/25/2024
Total Dose (Optional-Please Include Units): 5472.3 cGy
Ultrasound Used Text: Patient has a location which was not amenable to ultrasound.
Daily Dosage Administered For All Fractions (Required): 321.6
Assessment: Appropriate reaction
Treatment Time In Min (Optional): .44
Time Dose Fractionation (Optional- Include Units If Applicable): .44 min
Custom Shielding Afterword Text Will Not Be Included With Simple Simulations (X X Y Cm............): port to correlate with the lesion size, including treatment margin. The custom lead shield is adequate to accommodate the appropriate applicator and provide adequate shielding around the treatment site. Additional shielding (as noted below) is used to protect sensitive, normal tissues.
Energy (Optional-Please Include Units): 50 kV
Please Choose The Type Of Visit (Required): Treatment and Simulation Visit: Show Treatment/Simulation Variables
Simple Simulation Afterword Text Will Be Included With Simple Simulations (Indications............): The patient had a complete consultation regarding all applicable modalities for the treatment of their skin cancer and based on a variety of factors including the type of tumor, size, and location, the relevant medical history as well as local tissue factors, the functional status of the individual, the ability to perform necessary postoperative wound instructions and the need for simultaneous treatments as well as overall wound healing status, it was determined that the patient would begin radiation therapy treatment for skin cancer.  A full simulation and treatment device design was performed including the determination and formulation of appropriate simple and complex devices including lead shield of 0.762 mm thickness to form molded customized shielding to specifically correlate with the lesion size including treatment margin.  The custom lead shield is adequate to accommodate the appropriate applicator and provide adequate shielding around the treatment site.  The specific field applicator, shields, and devices both simple and complex as well as the specific patient setup is outlined below.  The patient was given a full consent for superficial radiation to both verbally and in writing and the full determination of patient's eligibility for treatment and selection is outlined on the patient eligibility and treatment selection form.  The specific superficial radiotherapy prescription was determined and was documented on the superficial radiotherapy prescription form.  A treatment calculation was also performed and documented on the treatment calculation form.  Based on the prescription, the patient was scheduled for a series of fractional treatments.
Total Number Of Fractions: 17
Daily Fractionated Dose (Optional- Include Units): 321.6 cGy
Fractions / Week: 2
Date Of Fraction 7: 12/05/2024
Custom Shielding Preamble Text Will Not Be Included With Simple Simulations (.......... X X Y Cm): A lead shield of 0.762 mm thickness is utilized to form a molded, custom shield with a
Bill For Simulation: Yes - (Simple Simulation: 11021)
Body Location Override (Optional): Nasal Dorsum
Treatment Time / Fractionation (Optional- Include Units): 731 cGy/min
Intro Statement (Will Not Render If Left Blank): The patient is undergoing superficial radiation therapy for skin cancer and presents for weekly evaluation and management.  Per protocol and as documented on the flow sheet, the patient was questioned as to subjective redness, pruritus, pain, drainage, fatigue, or any other symptoms.  Objectively, the radiation area was evaluated with regards to erythema, atrophy, scale, crusting, erosion, ulceration, edema, purpura, tenderness, warmth, drainage, and any other findings.  The plan was extensively reviewed including the dose, and dosing schedule.  The simulation and clinical setup was also reviewed as was the external and any internal shields and based on this review the appropriateness and sufficiency of treatment was determined.
Other Assessment (Will Overide Current Assessment): Appropriate for SRT
Information: Selecting Yes will display possible errors in your note based on the variables you have selected. This validation is only offered as a suggestion for you. PLEASE NOTE THAT THE VALIDATION TEXT WILL BE REMOVED WHEN YOU FINALIZE YOUR NOTE. IF YOU WANT TO FAX A PRELIMINARY NOTE YOU WILL NEED TO TOGGLE THIS TO 'NO' IF YOU DO NOT WANT IT IN YOUR FAXED NOTE.
Functional Status: 0 (fully active)
Shielding Size (Optional- Include Units): x cm
Energy Output In Cgy/Min (Optional): 731
Ssd In Cm (Optional): 15
Date Of Fraction 6: 12/03/2024

## 2024-12-10 NOTE — PROCEDURE: ADDITIONAL NOTES
Render Risk Assessment In Note?: no
Detail Level: Simple
Additional Notes: Medicare - I, Dr. Fernando Tran, attest that my ACGME accredited resident/fellow physician at the [facility] residency program and myself personally met with the patient face to face and examined the patient and I developed the plan of care. As the  and primary teaching physician which involves residents in providing care at [facility], I am able to provide the necessary direction, management and review for the resident’s services by direct supervision with the resident and the patient. Incident to services are being performed under the direct supervision of Dr Tran, initiation and continued involvement in treatment is always followed and he is immediately available as he is within the walls of this office during treatment to provide assistance and direction throughout the time the resident or fellow is performing services.

## 2024-12-12 ENCOUNTER — APPOINTMENT (OUTPATIENT)
Age: 69
Setting detail: DERMATOLOGY
End: 2024-12-12

## 2024-12-12 DIAGNOSIS — Z71.89 OTHER SPECIFIED COUNSELING: ICD-10-CM

## 2024-12-12 PROBLEM — C44.311 BASAL CELL CARCINOMA OF SKIN OF NOSE: Status: ACTIVE | Noted: 2024-12-12

## 2024-12-12 PROCEDURE — 77401: CPT

## 2024-12-12 PROCEDURE — ? SUPERFICIAL RADIATION TREATMENT

## 2024-12-12 PROCEDURE — 77280 THER RAD SIMULAJ FIELD SMPL: CPT

## 2024-12-12 PROCEDURE — ? ADDITIONAL NOTES

## 2024-12-12 NOTE — PROCEDURE: SUPERFICIAL RADIATION TREATMENT
Date Of Fraction 5: 11/27/2024
Computed Treatment Time In Min (Will Render The Same As Calculated Treatment Time If Left Blank): 0
Bill For Physics Consultation: Yes
Radiation Units: cGy
Prescription Used: 1
Date Of Fraction 3: 11/20/2024
Include Rx 4 When Rendering Additional Prescriptions: No
Field Size (Applicator): 1.5 cm
Additional Prescription Justification Text: If there is any interruption in treatment exceeding 5 days please see Decay and Dose Adjustment Calculation and complete treatment under Prescription 2, 3 or 4 as appropriate.
Detail Level: Detailed
Patient Positioning: Supine
Treatment Margins In Cm: 0.5
Date Of Fraction 1: 11/15/2024
Port Dimensions-Y Axis In Cm: 1.5
Simple Simulation Preamble Text Will Be Included With Simple Simulations (.......... Indications): Simple simulation was performed today for the following reasons:
Use Automated Fraction Number And Cumulative Dosage?: Yes (Use Enhanced Automation)
Total Rx Dosage (Required): 5472.3
Ultrasound Used Text: Ultrasound was utilized to place radiation therapy fields.
Field Number: [id number]
Fractionation Number (Evaluation): Override
Treatment Device Design After Initial Simulation Justification (Will Render If Bill For Treatment Devices = Yes): The patient is status post radiation simulation and is evaluated as to the use of additional devices for shielding and placement for radiation therapy.
Date Of Fraction 8: 12/10/2024
Date Of Fraction 2: 11/18/2024
Date Of Fraction 4: 11/25/2024
Total Dose (Optional-Please Include Units): 5472.3 cGy
Ultrasound Used Text: Patient has a location which was not amenable to ultrasound.
Daily Dosage Administered For All Fractions (Required): 321.6
Assessment: Appropriate reaction
Treatment Time In Min (Optional): .44
Time Dose Fractionation (Optional- Include Units If Applicable): .44 min
Custom Shielding Afterword Text Will Not Be Included With Simple Simulations (X X Y Cm............): port to correlate with the lesion size, including treatment margin. The custom lead shield is adequate to accommodate the appropriate applicator and provide adequate shielding around the treatment site. Additional shielding (as noted below) is used to protect sensitive, normal tissues.
Energy (Optional-Please Include Units): 50 kV
Please Choose The Type Of Visit (Required): Treatment and Simulation Visit: Show Treatment/Simulation Variables
Simple Simulation Afterword Text Will Be Included With Simple Simulations (Indications............): The patient had a complete consultation regarding all applicable modalities for the treatment of their skin cancer and based on a variety of factors including the type of tumor, size, and location, the relevant medical history as well as local tissue factors, the functional status of the individual, the ability to perform necessary postoperative wound instructions and the need for simultaneous treatments as well as overall wound healing status, it was determined that the patient would begin radiation therapy treatment for skin cancer.  A full simulation and treatment device design was performed including the determination and formulation of appropriate simple and complex devices including lead shield of 0.762 mm thickness to form molded customized shielding to specifically correlate with the lesion size including treatment margin.  The custom lead shield is adequate to accommodate the appropriate applicator and provide adequate shielding around the treatment site.  The specific field applicator, shields, and devices both simple and complex as well as the specific patient setup is outlined below.  The patient was given a full consent for superficial radiation to both verbally and in writing and the full determination of patient's eligibility for treatment and selection is outlined on the patient eligibility and treatment selection form.  The specific superficial radiotherapy prescription was determined and was documented on the superficial radiotherapy prescription form.  A treatment calculation was also performed and documented on the treatment calculation form.  Based on the prescription, the patient was scheduled for a series of fractional treatments.
Total Number Of Fractions: 17
Daily Fractionated Dose (Optional- Include Units): 321.6 cGy
Fractions / Week: 2
Date Of Fraction 7: 12/05/2024
Custom Shielding Preamble Text Will Not Be Included With Simple Simulations (.......... X X Y Cm): A lead shield of 0.762 mm thickness is utilized to form a molded, custom shield with a
Bill For Simulation: Yes - (Simple Simulation: 88178)
Body Location Override (Optional): Nasal Dorsum
Treatment Time / Fractionation (Optional- Include Units): 731 cGy/min
Intro Statement (Will Not Render If Left Blank): The patient is undergoing superficial radiation therapy for skin cancer and presents for weekly evaluation and management.  Per protocol and as documented on the flow sheet, the patient was questioned as to subjective redness, pruritus, pain, drainage, fatigue, or any other symptoms.  Objectively, the radiation area was evaluated with regards to erythema, atrophy, scale, crusting, erosion, ulceration, edema, purpura, tenderness, warmth, drainage, and any other findings.  The plan was extensively reviewed including the dose, and dosing schedule.  The simulation and clinical setup was also reviewed as was the external and any internal shields and based on this review the appropriateness and sufficiency of treatment was determined.
Other Assessment (Will Overide Current Assessment): Appropriate for SRT
Date Of Fraction 9: 12/12/2024
Information: Selecting Yes will display possible errors in your note based on the variables you have selected. This validation is only offered as a suggestion for you. PLEASE NOTE THAT THE VALIDATION TEXT WILL BE REMOVED WHEN YOU FINALIZE YOUR NOTE. IF YOU WANT TO FAX A PRELIMINARY NOTE YOU WILL NEED TO TOGGLE THIS TO 'NO' IF YOU DO NOT WANT IT IN YOUR FAXED NOTE.
Functional Status: 0 (fully active)
Shielding Size (Optional- Include Units): x cm
Energy Output In Cgy/Min (Optional): 731
Ssd In Cm (Optional): 15
Date Of Fraction 6: 12/03/2024

## 2024-12-12 NOTE — PROCEDURE: ADDITIONAL NOTES
Render Risk Assessment In Note?: no
Detail Level: Simple
Additional Notes: Medicare - I, Dr. Fernando Tran, attest that my ACGME accredited resident/fellow physician at the [facility] residency program and myself personally met with the patient face to face and examined the patient and I developed the plan of care. As the  and primary teaching physician which involves residents in providing care at [facility], I am able to provide the necessary direction, management and review for the resident’s services by direct supervision with the resident and the patient. Incident to services are being performed under the direct supervision of Dr Tran, initiation and continued involvement in treatment is always followed and he is immediately available as he is within the walls of this office during treatment to provide assistance and direction throughout the time the resident or fellow is performing services.

## 2024-12-16 ENCOUNTER — APPOINTMENT (OUTPATIENT)
Age: 69
Setting detail: DERMATOLOGY
End: 2024-12-16

## 2024-12-16 DIAGNOSIS — Z71.89 OTHER SPECIFIED COUNSELING: ICD-10-CM

## 2024-12-16 PROBLEM — C44.311 BASAL CELL CARCINOMA OF SKIN OF NOSE: Status: ACTIVE | Noted: 2024-12-16

## 2024-12-16 PROCEDURE — ? SUPERFICIAL RADIATION TREATMENT

## 2024-12-16 PROCEDURE — ? ADDITIONAL NOTES

## 2024-12-16 PROCEDURE — 77280 THER RAD SIMULAJ FIELD SMPL: CPT

## 2024-12-16 PROCEDURE — 77401: CPT

## 2024-12-16 NOTE — PROCEDURE: SUPERFICIAL RADIATION TREATMENT
Date Of Fraction 5: 11/27/2024
Computed Treatment Time In Min (Will Render The Same As Calculated Treatment Time If Left Blank): 0
Bill For Physics Consultation: Yes
Radiation Units: cGy
Prescription Used: 1
Date Of Fraction 3: 11/20/2024
Include Rx 4 When Rendering Additional Prescriptions: No
Field Size (Applicator): 1.5 cm
Additional Prescription Justification Text: If there is any interruption in treatment exceeding 5 days please see Decay and Dose Adjustment Calculation and complete treatment under Prescription 2, 3 or 4 as appropriate.
Detail Level: Detailed
Patient Positioning: Supine
Treatment Margins In Cm: 0.5
Date Of Fraction 1: 11/15/2024
Port Dimensions-Y Axis In Cm: 1.5
Date Of Fraction 10: 12/16/2024
Simple Simulation Preamble Text Will Be Included With Simple Simulations (.......... Indications): Simple simulation was performed today for the following reasons:
Use Automated Fraction Number And Cumulative Dosage?: Yes (Use Enhanced Automation)
Total Rx Dosage (Required): 5472.3
Ultrasound Used Text: Ultrasound was utilized to place radiation therapy fields.
Field Number: [id number]
Fractionation Number (Evaluation): Override
Treatment Device Design After Initial Simulation Justification (Will Render If Bill For Treatment Devices = Yes): The patient is status post radiation simulation and is evaluated as to the use of additional devices for shielding and placement for radiation therapy.
Date Of Fraction 8: 12/10/2024
Date Of Fraction 2: 11/18/2024
Date Of Fraction 4: 11/25/2024
Total Dose (Optional-Please Include Units): 5472.3 cGy
Ultrasound Used Text: Patient has a location which was not amenable to ultrasound.
Daily Dosage Administered For All Fractions (Required): 321.6
Assessment: Appropriate reaction
Treatment Time In Min (Optional): .44
Time Dose Fractionation (Optional- Include Units If Applicable): .44 min
Custom Shielding Afterword Text Will Not Be Included With Simple Simulations (X X Y Cm............): port to correlate with the lesion size, including treatment margin. The custom lead shield is adequate to accommodate the appropriate applicator and provide adequate shielding around the treatment site. Additional shielding (as noted below) is used to protect sensitive, normal tissues.
Energy (Optional-Please Include Units): 50 kV
Please Choose The Type Of Visit (Required): Treatment and Simulation Visit: Show Treatment/Simulation Variables
Simple Simulation Afterword Text Will Be Included With Simple Simulations (Indications............): The patient had a complete consultation regarding all applicable modalities for the treatment of their skin cancer and based on a variety of factors including the type of tumor, size, and location, the relevant medical history as well as local tissue factors, the functional status of the individual, the ability to perform necessary postoperative wound instructions and the need for simultaneous treatments as well as overall wound healing status, it was determined that the patient would begin radiation therapy treatment for skin cancer.  A full simulation and treatment device design was performed including the determination and formulation of appropriate simple and complex devices including lead shield of 0.762 mm thickness to form molded customized shielding to specifically correlate with the lesion size including treatment margin.  The custom lead shield is adequate to accommodate the appropriate applicator and provide adequate shielding around the treatment site.  The specific field applicator, shields, and devices both simple and complex as well as the specific patient setup is outlined below.  The patient was given a full consent for superficial radiation to both verbally and in writing and the full determination of patient's eligibility for treatment and selection is outlined on the patient eligibility and treatment selection form.  The specific superficial radiotherapy prescription was determined and was documented on the superficial radiotherapy prescription form.  A treatment calculation was also performed and documented on the treatment calculation form.  Based on the prescription, the patient was scheduled for a series of fractional treatments.
Total Number Of Fractions: 17
Daily Fractionated Dose (Optional- Include Units): 321.6 cGy
Fractions / Week: 2
Date Of Fraction 7: 12/05/2024
Custom Shielding Preamble Text Will Not Be Included With Simple Simulations (.......... X X Y Cm): A lead shield of 0.762 mm thickness is utilized to form a molded, custom shield with a
Bill For Simulation: Yes - (Simple Simulation: 47302)
Body Location Override (Optional): Nasal Dorsum
Treatment Time / Fractionation (Optional- Include Units): 731 cGy/min
Intro Statement (Will Not Render If Left Blank): The patient is undergoing superficial radiation therapy for skin cancer and presents for weekly evaluation and management.  Per protocol and as documented on the flow sheet, the patient was questioned as to subjective redness, pruritus, pain, drainage, fatigue, or any other symptoms.  Objectively, the radiation area was evaluated with regards to erythema, atrophy, scale, crusting, erosion, ulceration, edema, purpura, tenderness, warmth, drainage, and any other findings.  The plan was extensively reviewed including the dose, and dosing schedule.  The simulation and clinical setup was also reviewed as was the external and any internal shields and based on this review the appropriateness and sufficiency of treatment was determined.
Other Assessment (Will Overide Current Assessment): Appropriate for SRT
Date Of Fraction 9: 12/12/2024
Information: Selecting Yes will display possible errors in your note based on the variables you have selected. This validation is only offered as a suggestion for you. PLEASE NOTE THAT THE VALIDATION TEXT WILL BE REMOVED WHEN YOU FINALIZE YOUR NOTE. IF YOU WANT TO FAX A PRELIMINARY NOTE YOU WILL NEED TO TOGGLE THIS TO 'NO' IF YOU DO NOT WANT IT IN YOUR FAXED NOTE.
Functional Status: 0 (fully active)
Shielding Size (Optional- Include Units): x cm
Energy Output In Cgy/Min (Optional): 731
Ssd In Cm (Optional): 15
Date Of Fraction 6: 12/03/2024

## 2024-12-16 NOTE — PROCEDURE: ADDITIONAL NOTES
Render Risk Assessment In Note?: no
Detail Level: Simple
Additional Notes: Medicare - I, Dr. Fernando Tran, attest that my ACGME accredited resident/fellow physician at the [facility] residency program and myself personally met with the patient face to face and examined the patient and I developed the plan of care. As the  and primary teaching physician which involves residents in providing care at [facility], I am able to provide the necessary direction, management and review for the resident’s services by direct supervision with the resident and the patient. Incident to services are being performed under the direct supervision of Dr Tran, initiation and continued involvement in treatment is always followed and he is immediately available as he is within the walls of this office during treatment to provide assistance and direction throughout the time the resident or fellow is performing services.

## 2024-12-18 ENCOUNTER — APPOINTMENT (OUTPATIENT)
Age: 69
Setting detail: DERMATOLOGY
End: 2024-12-18

## 2024-12-18 DIAGNOSIS — Z71.89 OTHER SPECIFIED COUNSELING: ICD-10-CM

## 2024-12-18 PROBLEM — C44.311 BASAL CELL CARCINOMA OF SKIN OF NOSE: Status: ACTIVE | Noted: 2024-12-18

## 2024-12-18 PROCEDURE — 77280 THER RAD SIMULAJ FIELD SMPL: CPT

## 2024-12-18 PROCEDURE — ? SUPERFICIAL RADIATION TREATMENT

## 2024-12-18 PROCEDURE — ? ADDITIONAL NOTES

## 2024-12-18 PROCEDURE — 77401: CPT

## 2024-12-18 NOTE — PROCEDURE: SUPERFICIAL RADIATION TREATMENT
Date Of Fraction 5: 11/27/2024
Computed Treatment Time In Min (Will Render The Same As Calculated Treatment Time If Left Blank): 0
Bill For Physics Consultation: Yes
Radiation Units: cGy
Prescription Used: 1
Date Of Fraction 3: 11/20/2024
Include Rx 4 When Rendering Additional Prescriptions: No
Field Size (Applicator): 1.5 cm
Additional Prescription Justification Text: If there is any interruption in treatment exceeding 5 days please see Decay and Dose Adjustment Calculation and complete treatment under Prescription 2, 3 or 4 as appropriate.
Detail Level: Detailed
Patient Positioning: Supine
Treatment Margins In Cm: 0.5
Date Of Fraction 1: 11/15/2024
Port Dimensions-Y Axis In Cm: 1.5
Date Of Fraction 10: 12/16/2024
Simple Simulation Preamble Text Will Be Included With Simple Simulations (.......... Indications): Simple simulation was performed today for the following reasons:
Use Automated Fraction Number And Cumulative Dosage?: Yes (Use Enhanced Automation)
Total Rx Dosage (Required): 5472.3
Ultrasound Used Text: Ultrasound was utilized to place radiation therapy fields.
Field Number: [id number]
Fractionation Number (Evaluation): Override
Treatment Device Design After Initial Simulation Justification (Will Render If Bill For Treatment Devices = Yes): The patient is status post radiation simulation and is evaluated as to the use of additional devices for shielding and placement for radiation therapy.
Date Of Fraction 8: 12/10/2024
Date Of Fraction 2: 11/18/2024
Date Of Fraction 4: 11/25/2024
Date Of Fraction 11: 12/18/2024
Total Dose (Optional-Please Include Units): 5472.3 cGy
Ultrasound Used Text: Patient has a location which was not amenable to ultrasound.
Daily Dosage Administered For All Fractions (Required): 321.6
Assessment: Appropriate reaction
Treatment Time In Min (Optional): .44
Time Dose Fractionation (Optional- Include Units If Applicable): .44 min
Custom Shielding Afterword Text Will Not Be Included With Simple Simulations (X X Y Cm............): port to correlate with the lesion size, including treatment margin. The custom lead shield is adequate to accommodate the appropriate applicator and provide adequate shielding around the treatment site. Additional shielding (as noted below) is used to protect sensitive, normal tissues.
Energy (Optional-Please Include Units): 50 kV
Please Choose The Type Of Visit (Required): Treatment and Simulation Visit: Show Treatment/Simulation Variables
Simple Simulation Afterword Text Will Be Included With Simple Simulations (Indications............): The patient had a complete consultation regarding all applicable modalities for the treatment of their skin cancer and based on a variety of factors including the type of tumor, size, and location, the relevant medical history as well as local tissue factors, the functional status of the individual, the ability to perform necessary postoperative wound instructions and the need for simultaneous treatments as well as overall wound healing status, it was determined that the patient would begin radiation therapy treatment for skin cancer.  A full simulation and treatment device design was performed including the determination and formulation of appropriate simple and complex devices including lead shield of 0.762 mm thickness to form molded customized shielding to specifically correlate with the lesion size including treatment margin.  The custom lead shield is adequate to accommodate the appropriate applicator and provide adequate shielding around the treatment site.  The specific field applicator, shields, and devices both simple and complex as well as the specific patient setup is outlined below.  The patient was given a full consent for superficial radiation to both verbally and in writing and the full determination of patient's eligibility for treatment and selection is outlined on the patient eligibility and treatment selection form.  The specific superficial radiotherapy prescription was determined and was documented on the superficial radiotherapy prescription form.  A treatment calculation was also performed and documented on the treatment calculation form.  Based on the prescription, the patient was scheduled for a series of fractional treatments.
Total Number Of Fractions: 17
Daily Fractionated Dose (Optional- Include Units): 321.6 cGy
Fractions / Week: 2
Date Of Fraction 7: 12/05/2024
Custom Shielding Preamble Text Will Not Be Included With Simple Simulations (.......... X X Y Cm): A lead shield of 0.762 mm thickness is utilized to form a molded, custom shield with a
Bill For Simulation: Yes - (Simple Simulation: 60353)
Body Location Override (Optional): Nasal Dorsum
Treatment Time / Fractionation (Optional- Include Units): 731 cGy/min
Intro Statement (Will Not Render If Left Blank): The patient is undergoing superficial radiation therapy for skin cancer and presents for weekly evaluation and management.  Per protocol and as documented on the flow sheet, the patient was questioned as to subjective redness, pruritus, pain, drainage, fatigue, or any other symptoms.  Objectively, the radiation area was evaluated with regards to erythema, atrophy, scale, crusting, erosion, ulceration, edema, purpura, tenderness, warmth, drainage, and any other findings.  The plan was extensively reviewed including the dose, and dosing schedule.  The simulation and clinical setup was also reviewed as was the external and any internal shields and based on this review the appropriateness and sufficiency of treatment was determined.
Other Assessment (Will Overide Current Assessment): Appropriate for SRT
Date Of Fraction 9: 12/12/2024
Information: Selecting Yes will display possible errors in your note based on the variables you have selected. This validation is only offered as a suggestion for you. PLEASE NOTE THAT THE VALIDATION TEXT WILL BE REMOVED WHEN YOU FINALIZE YOUR NOTE. IF YOU WANT TO FAX A PRELIMINARY NOTE YOU WILL NEED TO TOGGLE THIS TO 'NO' IF YOU DO NOT WANT IT IN YOUR FAXED NOTE.
Functional Status: 0 (fully active)
Shielding Size (Optional- Include Units): x cm
Energy Output In Cgy/Min (Optional): 731
Ssd In Cm (Optional): 15
Date Of Fraction 6: 12/03/2024

## 2024-12-18 NOTE — PROCEDURE: ADDITIONAL NOTES
Render Risk Assessment In Note?: no
Detail Level: Simple
Additional Notes: Medicare - I, Dr. Fernando Tran, attest that my ACGME accredited resident/fellow physician at the [facility] residency program and myself personally met with the patient face to face and examined the patient and I developed the plan of care. As the  and primary teaching physician which involves residents in providing care at [facility], I am able to provide the necessary direction, management and review for the resident’s services by direct supervision with the resident and the patient. Incident to services are being performed under the direct supervision of Dr Tran, initiation and continued involvement in treatment is always followed and he is immediately available as he is within the walls of this office during treatment to provide assistance and direction throughout the time the resident or fellow is performing services.

## 2024-12-23 ENCOUNTER — APPOINTMENT (OUTPATIENT)
Age: 69
Setting detail: DERMATOLOGY
End: 2024-12-23

## 2024-12-23 DIAGNOSIS — Z71.89 OTHER SPECIFIED COUNSELING: ICD-10-CM

## 2024-12-23 PROBLEM — C44.311 BASAL CELL CARCINOMA OF SKIN OF NOSE: Status: ACTIVE | Noted: 2024-12-23

## 2024-12-23 PROCEDURE — ? SUPERFICIAL RADIATION TREATMENT

## 2024-12-23 PROCEDURE — 77280 THER RAD SIMULAJ FIELD SMPL: CPT

## 2024-12-23 PROCEDURE — 77401: CPT

## 2024-12-23 PROCEDURE — ? ADDITIONAL NOTES

## 2024-12-23 NOTE — PROCEDURE: ADDITIONAL NOTES
Additional Notes: Medicare - I, Dr. David Benson, attest that my ACGME accredited resident/fellow physician at the [facility] residency program and myself personally met with the patient face to face and examined the patient and I developed the plan of care. As a teaching physician which involves residents in providing care at [facility], I am able to provide the necessary direction, management and review for the resident’s services by direct supervision with the resident and the patient. Incident to services are being performed under the direct supervision of Dr. Benson, initiation and continued involvement in treatment is always followed and he is immediately available as he is within the walls of this office during treatment to provide assistance and direction throughout the time the resident or fellow is performing services.
Render Risk Assessment In Note?: no
Detail Level: Generalized

## 2024-12-23 NOTE — PROCEDURE: SUPERFICIAL RADIATION TREATMENT
Date Of Fraction 5: 11/27/2024
Computed Treatment Time In Min (Will Render The Same As Calculated Treatment Time If Left Blank): 0
Bill For Physics Consultation: Yes
Radiation Units: cGy
Prescription Used: 1
Date Of Fraction 3: 11/20/2024
Include Rx 4 When Rendering Additional Prescriptions: No
Field Size (Applicator): 1.5 cm
Additional Prescription Justification Text: If there is any interruption in treatment exceeding 5 days please see Decay and Dose Adjustment Calculation and complete treatment under Prescription 2, 3 or 4 as appropriate.
Detail Level: Detailed
Patient Positioning: Supine
Treatment Margins In Cm: 0.5
Date Of Fraction 1: 11/15/2024
Port Dimensions-Y Axis In Cm: 1.5
Date Of Fraction 10: 12/16/2024
Simple Simulation Preamble Text Will Be Included With Simple Simulations (.......... Indications): Simple simulation was performed today for the following reasons:
Use Automated Fraction Number And Cumulative Dosage?: Yes (Use Enhanced Automation)
Total Rx Dosage (Required): 5472.3
Ultrasound Used Text: Ultrasound was utilized to place radiation therapy fields.
Field Number: [id number]
Fractionation Number (Evaluation): Override
Treatment Device Design After Initial Simulation Justification (Will Render If Bill For Treatment Devices = Yes): The patient is status post radiation simulation and is evaluated as to the use of additional devices for shielding and placement for radiation therapy.
Date Of Fraction 8: 12/10/2024
Date Of Fraction 2: 11/18/2024
Date Of Fraction 4: 11/25/2024
Date Of Fraction 11: 12/18/2024
Total Dose (Optional-Please Include Units): 5472.3 cGy
Ultrasound Used Text: Patient has a location which was not amenable to ultrasound.
Daily Dosage Administered For All Fractions (Required): 321.6
Date Of Fraction 12: 12/23/2024
Assessment: Appropriate reaction
Treatment Time In Min (Optional): .44
Time Dose Fractionation (Optional- Include Units If Applicable): .44 min
Custom Shielding Afterword Text Will Not Be Included With Simple Simulations (X X Y Cm............): port to correlate with the lesion size, including treatment margin. The custom lead shield is adequate to accommodate the appropriate applicator and provide adequate shielding around the treatment site. Additional shielding (as noted below) is used to protect sensitive, normal tissues.
Energy (Optional-Please Include Units): 50 kV
Please Choose The Type Of Visit (Required): Treatment and Simulation Visit: Show Treatment/Simulation Variables
Simple Simulation Afterword Text Will Be Included With Simple Simulations (Indications............): The patient had a complete consultation regarding all applicable modalities for the treatment of their skin cancer and based on a variety of factors including the type of tumor, size, and location, the relevant medical history as well as local tissue factors, the functional status of the individual, the ability to perform necessary postoperative wound instructions and the need for simultaneous treatments as well as overall wound healing status, it was determined that the patient would begin radiation therapy treatment for skin cancer.  A full simulation and treatment device design was performed including the determination and formulation of appropriate simple and complex devices including lead shield of 0.762 mm thickness to form molded customized shielding to specifically correlate with the lesion size including treatment margin.  The custom lead shield is adequate to accommodate the appropriate applicator and provide adequate shielding around the treatment site.  The specific field applicator, shields, and devices both simple and complex as well as the specific patient setup is outlined below.  The patient was given a full consent for superficial radiation to both verbally and in writing and the full determination of patient's eligibility for treatment and selection is outlined on the patient eligibility and treatment selection form.  The specific superficial radiotherapy prescription was determined and was documented on the superficial radiotherapy prescription form.  A treatment calculation was also performed and documented on the treatment calculation form.  Based on the prescription, the patient was scheduled for a series of fractional treatments.
Total Number Of Fractions: 17
Daily Fractionated Dose (Optional- Include Units): 321.6 cGy
Fractions / Week: 2
Date Of Fraction 7: 12/05/2024
Custom Shielding Preamble Text Will Not Be Included With Simple Simulations (.......... X X Y Cm): A lead shield of 0.762 mm thickness is utilized to form a molded, custom shield with a
Bill For Simulation: Yes - (Simple Simulation: 54213)
Body Location Override (Optional): Nasal Dorsum
Treatment Time / Fractionation (Optional- Include Units): 731 cGy/min
Intro Statement (Will Not Render If Left Blank): The patient is undergoing superficial radiation therapy for skin cancer and presents for weekly evaluation and management.  Per protocol and as documented on the flow sheet, the patient was questioned as to subjective redness, pruritus, pain, drainage, fatigue, or any other symptoms.  Objectively, the radiation area was evaluated with regards to erythema, atrophy, scale, crusting, erosion, ulceration, edema, purpura, tenderness, warmth, drainage, and any other findings.  The plan was extensively reviewed including the dose, and dosing schedule.  The simulation and clinical setup was also reviewed as was the external and any internal shields and based on this review the appropriateness and sufficiency of treatment was determined.
Other Assessment (Will Overide Current Assessment): Appropriate for SRT
Date Of Fraction 9: 12/12/2024
Information: Selecting Yes will display possible errors in your note based on the variables you have selected. This validation is only offered as a suggestion for you. PLEASE NOTE THAT THE VALIDATION TEXT WILL BE REMOVED WHEN YOU FINALIZE YOUR NOTE. IF YOU WANT TO FAX A PRELIMINARY NOTE YOU WILL NEED TO TOGGLE THIS TO 'NO' IF YOU DO NOT WANT IT IN YOUR FAXED NOTE.
Functional Status: 0 (fully active)
Shielding Size (Optional- Include Units): x cm
Energy Output In Cgy/Min (Optional): 731
Ssd In Cm (Optional): 15
Date Of Fraction 6: 12/03/2024

## 2024-12-30 ENCOUNTER — APPOINTMENT (OUTPATIENT)
Age: 69
Setting detail: DERMATOLOGY
End: 2024-12-30

## 2024-12-30 DIAGNOSIS — Z71.89 OTHER SPECIFIED COUNSELING: ICD-10-CM

## 2024-12-30 PROBLEM — C44.311 BASAL CELL CARCINOMA OF SKIN OF NOSE: Status: ACTIVE | Noted: 2024-12-30

## 2024-12-30 PROCEDURE — 77280 THER RAD SIMULAJ FIELD SMPL: CPT

## 2024-12-30 PROCEDURE — ? SUPERFICIAL RADIATION TREATMENT

## 2024-12-30 PROCEDURE — ? ADDITIONAL NOTES

## 2024-12-30 PROCEDURE — 77401: CPT

## 2024-12-30 NOTE — PROCEDURE: ADDITIONAL NOTES
Additional Notes: Medicare - I, Dr. David Benson, attest that my ACGME accredited resident/fellow physician at the [facility] residency program and myself personally met with the patient face to face and examined the patient and I developed the plan of care. As a teaching physician which involves residents in providing care at [facility], I am able to provide the necessary direction, management and review for the resident’s services by direct supervision with the resident and the patient. Incident to services are being performed under the direct supervision of Dr. Benson, initiation and continued involvement in treatment is always followed and he is immediately available as he is within the walls of this office during treatment to provide assistance and direction throughout the time the resident or fellow is performing services.
Render Risk Assessment In Note?: no
Detail Level: Generalized

## 2024-12-30 NOTE — PROCEDURE: SUPERFICIAL RADIATION TREATMENT
Date Of Fraction 5: 11/27/2024
Computed Treatment Time In Min (Will Render The Same As Calculated Treatment Time If Left Blank): 0
Bill For Physics Consultation: Yes
Radiation Units: cGy
Prescription Used: 1
Date Of Fraction 3: 11/20/2024
Include Rx 4 When Rendering Additional Prescriptions: No
Field Size (Applicator): 1.5 cm
Additional Prescription Justification Text: If there is any interruption in treatment exceeding 5 days please see Decay and Dose Adjustment Calculation and complete treatment under Prescription 2, 3 or 4 as appropriate.
Detail Level: Detailed
Patient Positioning: Supine
Treatment Margins In Cm: 0.5
Date Of Fraction 1: 11/15/2024
Port Dimensions-Y Axis In Cm: 1.5
Date Of Fraction 10: 12/16/2024
Simple Simulation Preamble Text Will Be Included With Simple Simulations (.......... Indications): Simple simulation was performed today for the following reasons:
Use Automated Fraction Number And Cumulative Dosage?: Yes (Use Enhanced Automation)
Total Rx Dosage (Required): 5472.3
Ultrasound Used Text: Ultrasound was utilized to place radiation therapy fields.
Field Number: [id number]
Fractionation Number (Evaluation): Override
Treatment Device Design After Initial Simulation Justification (Will Render If Bill For Treatment Devices = Yes): The patient is status post radiation simulation and is evaluated as to the use of additional devices for shielding and placement for radiation therapy.
Date Of Fraction 8: 12/10/2024
Date Of Fraction 2: 11/18/2024
Date Of Fraction 4: 11/25/2024
Date Of Fraction 11: 12/18/2024
Total Dose (Optional-Please Include Units): 5472.3 cGy
Ultrasound Used Text: Patient has a location which was not amenable to ultrasound.
Daily Dosage Administered For All Fractions (Required): 321.6
Date Of Fraction 12: 12/23/2024
Assessment: Appropriate reaction
Treatment Time In Min (Optional): .44
Time Dose Fractionation (Optional- Include Units If Applicable): .44 min
Custom Shielding Afterword Text Will Not Be Included With Simple Simulations (X X Y Cm............): port to correlate with the lesion size, including treatment margin. The custom lead shield is adequate to accommodate the appropriate applicator and provide adequate shielding around the treatment site. Additional shielding (as noted below) is used to protect sensitive, normal tissues.
Energy (Optional-Please Include Units): 50 kV
Please Choose The Type Of Visit (Required): Treatment and Simulation Visit: Show Treatment/Simulation Variables
Simple Simulation Afterword Text Will Be Included With Simple Simulations (Indications............): The patient had a complete consultation regarding all applicable modalities for the treatment of their skin cancer and based on a variety of factors including the type of tumor, size, and location, the relevant medical history as well as local tissue factors, the functional status of the individual, the ability to perform necessary postoperative wound instructions and the need for simultaneous treatments as well as overall wound healing status, it was determined that the patient would begin radiation therapy treatment for skin cancer.  A full simulation and treatment device design was performed including the determination and formulation of appropriate simple and complex devices including lead shield of 0.762 mm thickness to form molded customized shielding to specifically correlate with the lesion size including treatment margin.  The custom lead shield is adequate to accommodate the appropriate applicator and provide adequate shielding around the treatment site.  The specific field applicator, shields, and devices both simple and complex as well as the specific patient setup is outlined below.  The patient was given a full consent for superficial radiation to both verbally and in writing and the full determination of patient's eligibility for treatment and selection is outlined on the patient eligibility and treatment selection form.  The specific superficial radiotherapy prescription was determined and was documented on the superficial radiotherapy prescription form.  A treatment calculation was also performed and documented on the treatment calculation form.  Based on the prescription, the patient was scheduled for a series of fractional treatments.
Total Number Of Fractions: 17
Daily Fractionated Dose (Optional- Include Units): 321.6 cGy
Fractions / Week: 2
Date Of Fraction 7: 12/05/2024
Custom Shielding Preamble Text Will Not Be Included With Simple Simulations (.......... X X Y Cm): A lead shield of 0.762 mm thickness is utilized to form a molded, custom shield with a
Bill For Simulation: Yes - (Simple Simulation: 51795)
Date Of Fraction 13: 12/30/2024
Body Location Override (Optional): Nasal Dorsum
Treatment Time / Fractionation (Optional- Include Units): 731 cGy/min
Intro Statement (Will Not Render If Left Blank): The patient is undergoing superficial radiation therapy for skin cancer and presents for weekly evaluation and management.  Per protocol and as documented on the flow sheet, the patient was questioned as to subjective redness, pruritus, pain, drainage, fatigue, or any other symptoms.  Objectively, the radiation area was evaluated with regards to erythema, atrophy, scale, crusting, erosion, ulceration, edema, purpura, tenderness, warmth, drainage, and any other findings.  The plan was extensively reviewed including the dose, and dosing schedule.  The simulation and clinical setup was also reviewed as was the external and any internal shields and based on this review the appropriateness and sufficiency of treatment was determined.
Other Assessment (Will Overide Current Assessment): Appropriate for SRT
Date Of Fraction 9: 12/12/2024
Information: Selecting Yes will display possible errors in your note based on the variables you have selected. This validation is only offered as a suggestion for you. PLEASE NOTE THAT THE VALIDATION TEXT WILL BE REMOVED WHEN YOU FINALIZE YOUR NOTE. IF YOU WANT TO FAX A PRELIMINARY NOTE YOU WILL NEED TO TOGGLE THIS TO 'NO' IF YOU DO NOT WANT IT IN YOUR FAXED NOTE.
Functional Status: 0 (fully active)
Shielding Size (Optional- Include Units): x cm
Energy Output In Cgy/Min (Optional): 731
Ssd In Cm (Optional): 15
Date Of Fraction 6: 12/03/2024

## 2025-01-02 ENCOUNTER — APPOINTMENT (OUTPATIENT)
Age: 70
Setting detail: DERMATOLOGY
End: 2025-01-02

## 2025-01-02 DIAGNOSIS — Z71.89 OTHER SPECIFIED COUNSELING: ICD-10-CM

## 2025-01-02 PROBLEM — C44.311 BASAL CELL CARCINOMA OF SKIN OF NOSE: Status: ACTIVE | Noted: 2025-01-02

## 2025-01-02 PROCEDURE — 77401: CPT

## 2025-01-02 PROCEDURE — 77280 THER RAD SIMULAJ FIELD SMPL: CPT

## 2025-01-02 PROCEDURE — ? SUPERFICIAL RADIATION TREATMENT

## 2025-01-02 PROCEDURE — ? ADDITIONAL NOTES

## 2025-01-02 NOTE — PROCEDURE: SUPERFICIAL RADIATION TREATMENT
Date Of Fraction 5: 11/27/2024
Computed Treatment Time In Min (Will Render The Same As Calculated Treatment Time If Left Blank): 0
Bill For Physics Consultation: Yes
Radiation Units: cGy
Prescription Used: 1
Date Of Fraction 3: 11/20/2024
Include Rx 4 When Rendering Additional Prescriptions: No
Field Size (Applicator): 1.5 cm
Additional Prescription Justification Text: If there is any interruption in treatment exceeding 5 days please see Decay and Dose Adjustment Calculation and complete treatment under Prescription 2, 3 or 4 as appropriate.
Detail Level: Detailed
Patient Positioning: Supine
Treatment Margins In Cm: 0.5
Date Of Fraction 1: 11/15/2024
Port Dimensions-Y Axis In Cm: 1.5
Date Of Fraction 10: 12/16/2024
Simple Simulation Preamble Text Will Be Included With Simple Simulations (.......... Indications): Simple simulation was performed today for the following reasons:
Use Automated Fraction Number And Cumulative Dosage?: Yes (Use Enhanced Automation)
Total Rx Dosage (Required): 5472.3
Ultrasound Used Text: Ultrasound was utilized to place radiation therapy fields.
Date Of Fraction 14: 01/02/2025
Field Number: [id number]
Fractionation Number (Evaluation): Override
Treatment Device Design After Initial Simulation Justification (Will Render If Bill For Treatment Devices = Yes): The patient is status post radiation simulation and is evaluated as to the use of additional devices for shielding and placement for radiation therapy.
Date Of Fraction 8: 12/10/2024
Date Of Fraction 2: 11/18/2024
Date Of Fraction 4: 11/25/2024
Date Of Fraction 11: 12/18/2024
Total Dose (Optional-Please Include Units): 5472.3 cGy
Ultrasound Used Text: Patient has a location which was not amenable to ultrasound.
Daily Dosage Administered For All Fractions (Required): 321.6
Date Of Fraction 12: 12/23/2024
Assessment: Appropriate reaction
Treatment Time In Min (Optional): .44
Time Dose Fractionation (Optional- Include Units If Applicable): .44 min
Custom Shielding Afterword Text Will Not Be Included With Simple Simulations (X X Y Cm............): port to correlate with the lesion size, including treatment margin. The custom lead shield is adequate to accommodate the appropriate applicator and provide adequate shielding around the treatment site. Additional shielding (as noted below) is used to protect sensitive, normal tissues.
Energy (Optional-Please Include Units): 50 kV
Please Choose The Type Of Visit (Required): Treatment and Simulation Visit: Show Treatment/Simulation Variables
Simple Simulation Afterword Text Will Be Included With Simple Simulations (Indications............): The patient had a complete consultation regarding all applicable modalities for the treatment of their skin cancer and based on a variety of factors including the type of tumor, size, and location, the relevant medical history as well as local tissue factors, the functional status of the individual, the ability to perform necessary postoperative wound instructions and the need for simultaneous treatments as well as overall wound healing status, it was determined that the patient would begin radiation therapy treatment for skin cancer.  A full simulation and treatment device design was performed including the determination and formulation of appropriate simple and complex devices including lead shield of 0.762 mm thickness to form molded customized shielding to specifically correlate with the lesion size including treatment margin.  The custom lead shield is adequate to accommodate the appropriate applicator and provide adequate shielding around the treatment site.  The specific field applicator, shields, and devices both simple and complex as well as the specific patient setup is outlined below.  The patient was given a full consent for superficial radiation to both verbally and in writing and the full determination of patient's eligibility for treatment and selection is outlined on the patient eligibility and treatment selection form.  The specific superficial radiotherapy prescription was determined and was documented on the superficial radiotherapy prescription form.  A treatment calculation was also performed and documented on the treatment calculation form.  Based on the prescription, the patient was scheduled for a series of fractional treatments.
Total Number Of Fractions: 17
Daily Fractionated Dose (Optional- Include Units): 321.6 cGy
Fractions / Week: 2
Date Of Fraction 7: 12/05/2024
Custom Shielding Preamble Text Will Not Be Included With Simple Simulations (.......... X X Y Cm): A lead shield of 0.762 mm thickness is utilized to form a molded, custom shield with a
Bill For Simulation: Yes - (Simple Simulation: 39476)
Date Of Fraction 13: 12/30/2024
Body Location Override (Optional): Nasal Dorsum
Treatment Time / Fractionation (Optional- Include Units): 731 cGy/min
Intro Statement (Will Not Render If Left Blank): The patient is undergoing superficial radiation therapy for skin cancer and presents for weekly evaluation and management.  Per protocol and as documented on the flow sheet, the patient was questioned as to subjective redness, pruritus, pain, drainage, fatigue, or any other symptoms.  Objectively, the radiation area was evaluated with regards to erythema, atrophy, scale, crusting, erosion, ulceration, edema, purpura, tenderness, warmth, drainage, and any other findings.  The plan was extensively reviewed including the dose, and dosing schedule.  The simulation and clinical setup was also reviewed as was the external and any internal shields and based on this review the appropriateness and sufficiency of treatment was determined.
Other Assessment (Will Overide Current Assessment): Appropriate for SRT
Date Of Fraction 9: 12/12/2024
Information: Selecting Yes will display possible errors in your note based on the variables you have selected. This validation is only offered as a suggestion for you. PLEASE NOTE THAT THE VALIDATION TEXT WILL BE REMOVED WHEN YOU FINALIZE YOUR NOTE. IF YOU WANT TO FAX A PRELIMINARY NOTE YOU WILL NEED TO TOGGLE THIS TO 'NO' IF YOU DO NOT WANT IT IN YOUR FAXED NOTE.
Functional Status: 0 (fully active)
Shielding Size (Optional- Include Units): x cm
Energy Output In Cgy/Min (Optional): 731
Ssd In Cm (Optional): 15
Date Of Fraction 6: 12/03/2024

## 2025-01-02 NOTE — PROCEDURE: ADDITIONAL NOTES
Additional Notes: Medicare - I, Dr. David Benson, attest that my ACGME accredited resident/fellow physician at the [facility] residency program and myself personally met with the patient face to face and examined the patient and I developed the plan of care. As a teaching physician which involves residents in providing care at [facility], I am able to provide the necessary direction, management and review for the resident’s services by direct supervision with the resident and the patient. Incident to services are being performed under the direct supervision of Dr. Benson, initiation and continued involvement in treatment is always followed and he is immediately available as he is within the walls of this office during treatment to provide assistance and direction throughout the time the resident or fellow is performing services.
Render Risk Assessment In Note?: no
Detail Level: Generalized

## 2025-01-06 ENCOUNTER — APPOINTMENT (OUTPATIENT)
Age: 70
Setting detail: DERMATOLOGY
End: 2025-01-06

## 2025-01-06 DIAGNOSIS — Z71.89 OTHER SPECIFIED COUNSELING: ICD-10-CM

## 2025-01-06 PROBLEM — C44.311 BASAL CELL CARCINOMA OF SKIN OF NOSE: Status: ACTIVE | Noted: 2025-01-06

## 2025-01-06 PROCEDURE — 77401: CPT

## 2025-01-06 PROCEDURE — ? ADDITIONAL NOTES

## 2025-01-06 PROCEDURE — 77280 THER RAD SIMULAJ FIELD SMPL: CPT

## 2025-01-06 PROCEDURE — ? SUPERFICIAL RADIATION TREATMENT

## 2025-01-06 NOTE — PROCEDURE: SUPERFICIAL RADIATION TREATMENT
Date Of Fraction 5: 11/27/2024
Computed Treatment Time In Min (Will Render The Same As Calculated Treatment Time If Left Blank): 0
Bill For Physics Consultation: Yes
Radiation Units: cGy
Prescription Used: 1
Date Of Fraction 3: 11/20/2024
Include Rx 4 When Rendering Additional Prescriptions: No
Field Size (Applicator): 1.5 cm
Additional Prescription Justification Text: If there is any interruption in treatment exceeding 5 days please see Decay and Dose Adjustment Calculation and complete treatment under Prescription 2, 3 or 4 as appropriate.
Date Of Fraction 15: 01/06/2025
Detail Level: Detailed
Patient Positioning: Supine
Treatment Margins In Cm: 0.5
Date Of Fraction 1: 11/15/2024
Port Dimensions-Y Axis In Cm: 1.5
Date Of Fraction 10: 12/16/2024
Simple Simulation Preamble Text Will Be Included With Simple Simulations (.......... Indications): Simple simulation was performed today for the following reasons:
Use Automated Fraction Number And Cumulative Dosage?: Yes (Use Enhanced Automation)
Total Rx Dosage (Required): 5472.3
Ultrasound Used Text: Ultrasound was utilized to place radiation therapy fields.
Date Of Fraction 14: 01/02/2025
Field Number: [id number]
Fractionation Number (Evaluation): Override
Treatment Device Design After Initial Simulation Justification (Will Render If Bill For Treatment Devices = Yes): The patient is status post radiation simulation and is evaluated as to the use of additional devices for shielding and placement for radiation therapy.
Date Of Fraction 8: 12/10/2024
Date Of Fraction 2: 11/18/2024
Date Of Fraction 4: 11/25/2024
Date Of Fraction 11: 12/18/2024
Total Dose (Optional-Please Include Units): 5472.3 cGy
Ultrasound Used Text: Patient has a location which was not amenable to ultrasound.
Daily Dosage Administered For All Fractions (Required): 321.6
Date Of Fraction 12: 12/23/2024
Assessment: Appropriate reaction
Treatment Time In Min (Optional): .44
Time Dose Fractionation (Optional- Include Units If Applicable): .44 min
Custom Shielding Afterword Text Will Not Be Included With Simple Simulations (X X Y Cm............): port to correlate with the lesion size, including treatment margin. The custom lead shield is adequate to accommodate the appropriate applicator and provide adequate shielding around the treatment site. Additional shielding (as noted below) is used to protect sensitive, normal tissues.
Energy (Optional-Please Include Units): 50 kV
Please Choose The Type Of Visit (Required): Treatment and Simulation Visit: Show Treatment/Simulation Variables
Simple Simulation Afterword Text Will Be Included With Simple Simulations (Indications............): The patient had a complete consultation regarding all applicable modalities for the treatment of their skin cancer and based on a variety of factors including the type of tumor, size, and location, the relevant medical history as well as local tissue factors, the functional status of the individual, the ability to perform necessary postoperative wound instructions and the need for simultaneous treatments as well as overall wound healing status, it was determined that the patient would begin radiation therapy treatment for skin cancer.  A full simulation and treatment device design was performed including the determination and formulation of appropriate simple and complex devices including lead shield of 0.762 mm thickness to form molded customized shielding to specifically correlate with the lesion size including treatment margin.  The custom lead shield is adequate to accommodate the appropriate applicator and provide adequate shielding around the treatment site.  The specific field applicator, shields, and devices both simple and complex as well as the specific patient setup is outlined below.  The patient was given a full consent for superficial radiation to both verbally and in writing and the full determination of patient's eligibility for treatment and selection is outlined on the patient eligibility and treatment selection form.  The specific superficial radiotherapy prescription was determined and was documented on the superficial radiotherapy prescription form.  A treatment calculation was also performed and documented on the treatment calculation form.  Based on the prescription, the patient was scheduled for a series of fractional treatments.
Total Number Of Fractions: 17
Daily Fractionated Dose (Optional- Include Units): 321.6 cGy
Fractions / Week: 2
Date Of Fraction 7: 12/05/2024
Custom Shielding Preamble Text Will Not Be Included With Simple Simulations (.......... X X Y Cm): A lead shield of 0.762 mm thickness is utilized to form a molded, custom shield with a
Bill For Simulation: Yes - (Simple Simulation: 26070)
Date Of Fraction 13: 12/30/2024
Body Location Override (Optional): Nasal Dorsum
Treatment Time / Fractionation (Optional- Include Units): 731 cGy/min
Intro Statement (Will Not Render If Left Blank): The patient is undergoing superficial radiation therapy for skin cancer and presents for weekly evaluation and management.  Per protocol and as documented on the flow sheet, the patient was questioned as to subjective redness, pruritus, pain, drainage, fatigue, or any other symptoms.  Objectively, the radiation area was evaluated with regards to erythema, atrophy, scale, crusting, erosion, ulceration, edema, purpura, tenderness, warmth, drainage, and any other findings.  The plan was extensively reviewed including the dose, and dosing schedule.  The simulation and clinical setup was also reviewed as was the external and any internal shields and based on this review the appropriateness and sufficiency of treatment was determined.
Other Assessment (Will Overide Current Assessment): Appropriate for SRT
Date Of Fraction 9: 12/12/2024
Information: Selecting Yes will display possible errors in your note based on the variables you have selected. This validation is only offered as a suggestion for you. PLEASE NOTE THAT THE VALIDATION TEXT WILL BE REMOVED WHEN YOU FINALIZE YOUR NOTE. IF YOU WANT TO FAX A PRELIMINARY NOTE YOU WILL NEED TO TOGGLE THIS TO 'NO' IF YOU DO NOT WANT IT IN YOUR FAXED NOTE.
Functional Status: 0 (fully active)
Shielding Size (Optional- Include Units): x cm
Energy Output In Cgy/Min (Optional): 731
Ssd In Cm (Optional): 15
Date Of Fraction 6: 12/03/2024

## 2025-01-06 NOTE — PROCEDURE: ADDITIONAL NOTES
Additional Notes: Medicare - I, Dr. Fernando Tran, attest that my ACGME accredited resident/fellow physician at the [facility] residency program and myself personally met with the patient face to face and examined the patient and I developed the plan of care. As the  and primary teaching physician which involves residents in providing care at [facility], I am able to provide the necessary direction, management and review for the resident’s services by direct supervision with the resident and the patient. Incident to services are being performed under the direct supervision of Dr Tran, initiation and continued involvement in treatment is always followed and he is immediately available as he is within the walls of this office during treatment to provide assistance and direction throughout the time the resident or fellow is performing services.
Detail Level: Simple
Render Risk Assessment In Note?: no

## 2025-01-08 ENCOUNTER — APPOINTMENT (OUTPATIENT)
Age: 70
Setting detail: DERMATOLOGY
End: 2025-01-08

## 2025-01-08 DIAGNOSIS — Z71.89 OTHER SPECIFIED COUNSELING: ICD-10-CM

## 2025-01-08 PROBLEM — C44.311 BASAL CELL CARCINOMA OF SKIN OF NOSE: Status: ACTIVE | Noted: 2025-01-08

## 2025-01-08 PROCEDURE — 77401: CPT

## 2025-01-08 PROCEDURE — 77280 THER RAD SIMULAJ FIELD SMPL: CPT

## 2025-01-08 PROCEDURE — ? SUPERFICIAL RADIATION TREATMENT

## 2025-01-08 PROCEDURE — ? ADDITIONAL NOTES

## 2025-01-08 NOTE — PROCEDURE: SUPERFICIAL RADIATION TREATMENT
Date Of Fraction 5: 11/27/2024
Computed Treatment Time In Min (Will Render The Same As Calculated Treatment Time If Left Blank): 0
Bill For Physics Consultation: Yes
Radiation Units: cGy
Prescription Used: 1
Date Of Fraction 3: 11/20/2024
Include Rx 4 When Rendering Additional Prescriptions: No
Field Size (Applicator): 1.5 cm
Additional Prescription Justification Text: If there is any interruption in treatment exceeding 5 days please see Decay and Dose Adjustment Calculation and complete treatment under Prescription 2, 3 or 4 as appropriate.
Date Of Fraction 15: 01/06/2025
Date Of Fraction 16: 01/08/2025
Detail Level: Detailed
Patient Positioning: Supine
Treatment Margins In Cm: 0.5
Date Of Fraction 1: 11/15/2024
Port Dimensions-Y Axis In Cm: 1.5
Date Of Fraction 10: 12/16/2024
Simple Simulation Preamble Text Will Be Included With Simple Simulations (.......... Indications): Simple simulation was performed today for the following reasons:
Use Automated Fraction Number And Cumulative Dosage?: Yes (Use Enhanced Automation)
Total Rx Dosage (Required): 5472.3
Ultrasound Used Text: Ultrasound was utilized to place radiation therapy fields.
Date Of Fraction 14: 01/02/2025
Field Number: [id number]
Fractionation Number (Evaluation): Override
Treatment Device Design After Initial Simulation Justification (Will Render If Bill For Treatment Devices = Yes): The patient is status post radiation simulation and is evaluated as to the use of additional devices for shielding and placement for radiation therapy.
Date Of Fraction 8: 12/10/2024
Date Of Fraction 2: 11/18/2024
Date Of Fraction 4: 11/25/2024
Date Of Fraction 11: 12/18/2024
Total Dose (Optional-Please Include Units): 5472.3 cGy
Ultrasound Used Text: Patient has a location which was not amenable to ultrasound.
Daily Dosage Administered For All Fractions (Required): 321.6
Date Of Fraction 12: 12/23/2024
Assessment: Appropriate reaction
Treatment Time In Min (Optional): .44
Time Dose Fractionation (Optional- Include Units If Applicable): .44 min
Custom Shielding Afterword Text Will Not Be Included With Simple Simulations (X X Y Cm............): port to correlate with the lesion size, including treatment margin. The custom lead shield is adequate to accommodate the appropriate applicator and provide adequate shielding around the treatment site. Additional shielding (as noted below) is used to protect sensitive, normal tissues.
Energy (Optional-Please Include Units): 50 kV
Please Choose The Type Of Visit (Required): Treatment and Simulation Visit: Show Treatment/Simulation Variables
Simple Simulation Afterword Text Will Be Included With Simple Simulations (Indications............): The patient had a complete consultation regarding all applicable modalities for the treatment of their skin cancer and based on a variety of factors including the type of tumor, size, and location, the relevant medical history as well as local tissue factors, the functional status of the individual, the ability to perform necessary postoperative wound instructions and the need for simultaneous treatments as well as overall wound healing status, it was determined that the patient would begin radiation therapy treatment for skin cancer.  A full simulation and treatment device design was performed including the determination and formulation of appropriate simple and complex devices including lead shield of 0.762 mm thickness to form molded customized shielding to specifically correlate with the lesion size including treatment margin.  The custom lead shield is adequate to accommodate the appropriate applicator and provide adequate shielding around the treatment site.  The specific field applicator, shields, and devices both simple and complex as well as the specific patient setup is outlined below.  The patient was given a full consent for superficial radiation to both verbally and in writing and the full determination of patient's eligibility for treatment and selection is outlined on the patient eligibility and treatment selection form.  The specific superficial radiotherapy prescription was determined and was documented on the superficial radiotherapy prescription form.  A treatment calculation was also performed and documented on the treatment calculation form.  Based on the prescription, the patient was scheduled for a series of fractional treatments.
Total Number Of Fractions: 17
Daily Fractionated Dose (Optional- Include Units): 321.6 cGy
Fractions / Week: 2
Date Of Fraction 7: 12/05/2024
Custom Shielding Preamble Text Will Not Be Included With Simple Simulations (.......... X X Y Cm): A lead shield of 0.762 mm thickness is utilized to form a molded, custom shield with a
Bill For Simulation: Yes - (Simple Simulation: 53503)
Date Of Fraction 13: 12/30/2024
Body Location Override (Optional): Nasal Dorsum
Treatment Time / Fractionation (Optional- Include Units): 731 cGy/min
Intro Statement (Will Not Render If Left Blank): The patient is undergoing superficial radiation therapy for skin cancer and presents for weekly evaluation and management.  Per protocol and as documented on the flow sheet, the patient was questioned as to subjective redness, pruritus, pain, drainage, fatigue, or any other symptoms.  Objectively, the radiation area was evaluated with regards to erythema, atrophy, scale, crusting, erosion, ulceration, edema, purpura, tenderness, warmth, drainage, and any other findings.  The plan was extensively reviewed including the dose, and dosing schedule.  The simulation and clinical setup was also reviewed as was the external and any internal shields and based on this review the appropriateness and sufficiency of treatment was determined.
Other Assessment (Will Overide Current Assessment): Appropriate for SRT
Date Of Fraction 9: 12/12/2024
Information: Selecting Yes will display possible errors in your note based on the variables you have selected. This validation is only offered as a suggestion for you. PLEASE NOTE THAT THE VALIDATION TEXT WILL BE REMOVED WHEN YOU FINALIZE YOUR NOTE. IF YOU WANT TO FAX A PRELIMINARY NOTE YOU WILL NEED TO TOGGLE THIS TO 'NO' IF YOU DO NOT WANT IT IN YOUR FAXED NOTE.
Functional Status: 0 (fully active)
Shielding Size (Optional- Include Units): x cm
Energy Output In Cgy/Min (Optional): 731
Ssd In Cm (Optional): 15
Date Of Fraction 6: 12/03/2024

## 2025-01-08 NOTE — PROCEDURE: ADDITIONAL NOTES
Detail Level: Generalized
Additional Notes: Medicare - I, Dr. David Benson, attest that my ACGME accredited resident/fellow physician at the [facility] residency program and myself personally met with the patient face to face and examined the patient and I developed the plan of care. As a teaching physician which involves residents in providing care at [facility], I am able to provide the necessary direction, management and review for the resident’s services by direct supervision with the resident and the patient. Incident to services are being performed under the direct supervision of Dr. Benson, initiation and continued involvement in treatment is always followed and he is immediately available as he is within the walls of this office during treatment to provide assistance and direction throughout the time the resident or fellow is performing services.
Render Risk Assessment In Note?: no

## 2025-01-13 ENCOUNTER — APPOINTMENT (OUTPATIENT)
Age: 70
Setting detail: DERMATOLOGY
End: 2025-01-13

## 2025-01-13 DIAGNOSIS — Z71.89 OTHER SPECIFIED COUNSELING: ICD-10-CM

## 2025-01-13 PROBLEM — C44.311 BASAL CELL CARCINOMA OF SKIN OF NOSE: Status: ACTIVE | Noted: 2025-01-13

## 2025-01-13 PROCEDURE — 77280 THER RAD SIMULAJ FIELD SMPL: CPT

## 2025-01-13 PROCEDURE — ? ADDITIONAL NOTES

## 2025-01-13 PROCEDURE — ? SUPERFICIAL RADIATION TREATMENT

## 2025-01-13 PROCEDURE — 77401: CPT

## 2025-01-13 NOTE — PROCEDURE: SUPERFICIAL RADIATION TREATMENT
Date Of Fraction 5: 11/27/2024
Computed Treatment Time In Min (Will Render The Same As Calculated Treatment Time If Left Blank): 0
Bill For Physics Consultation: Yes
Radiation Units: cGy
Prescription Used: 1
Date Of Fraction 3: 11/20/2024
Include Rx 4 When Rendering Additional Prescriptions: No
Field Size (Applicator): 1.5 cm
Additional Prescription Justification Text: If there is any interruption in treatment exceeding 5 days please see Decay and Dose Adjustment Calculation and complete treatment under Prescription 2, 3 or 4 as appropriate.
Date Of Fraction 15: 01/06/2025
Date Of Fraction 16: 01/08/2025
Detail Level: Detailed
Patient Positioning: Supine
Treatment Margins In Cm: 0.5
Date Of Fraction 1: 11/15/2024
Port Dimensions-Y Axis In Cm: 1.5
Date Of Fraction 10: 12/16/2024
Simple Simulation Preamble Text Will Be Included With Simple Simulations (.......... Indications): Simple simulation was performed today for the following reasons:
Use Automated Fraction Number And Cumulative Dosage?: Yes (Use Enhanced Automation)
Total Rx Dosage (Required): 5472.3
Ultrasound Used Text: Ultrasound was utilized to place radiation therapy fields.
Date Of Fraction 14: 01/02/2025
Field Number: [id number]
Fractionation Number (Evaluation): Override
Treatment Device Design After Initial Simulation Justification (Will Render If Bill For Treatment Devices = Yes): The patient is status post radiation simulation and is evaluated as to the use of additional devices for shielding and placement for radiation therapy.
Date Of Fraction 8: 12/10/2024
Date Of Fraction 2: 11/18/2024
Date Of Fraction 4: 11/25/2024
Date Of Fraction 11: 12/18/2024
Total Dose (Optional-Please Include Units): 5472.3 cGy
Ultrasound Used Text: Patient has a location which was not amenable to ultrasound.
Daily Dosage Administered For All Fractions (Required): 321.6
Date Of Fraction 12: 12/23/2024
Assessment: Appropriate reaction
Treatment Time In Min (Optional): .44
Time Dose Fractionation (Optional- Include Units If Applicable): .44 min
Custom Shielding Afterword Text Will Not Be Included With Simple Simulations (X X Y Cm............): port to correlate with the lesion size, including treatment margin. The custom lead shield is adequate to accommodate the appropriate applicator and provide adequate shielding around the treatment site. Additional shielding (as noted below) is used to protect sensitive, normal tissues.
Date Of Fraction 17: 01/13/2025
Energy (Optional-Please Include Units): 50 kV
Please Choose The Type Of Visit (Required): Treatment and Simulation Visit: Show Treatment/Simulation Variables
Simple Simulation Afterword Text Will Be Included With Simple Simulations (Indications............): The patient had a complete consultation regarding all applicable modalities for the treatment of their skin cancer and based on a variety of factors including the type of tumor, size, and location, the relevant medical history as well as local tissue factors, the functional status of the individual, the ability to perform necessary postoperative wound instructions and the need for simultaneous treatments as well as overall wound healing status, it was determined that the patient would begin radiation therapy treatment for skin cancer.  A full simulation and treatment device design was performed including the determination and formulation of appropriate simple and complex devices including lead shield of 0.762 mm thickness to form molded customized shielding to specifically correlate with the lesion size including treatment margin.  The custom lead shield is adequate to accommodate the appropriate applicator and provide adequate shielding around the treatment site.  The specific field applicator, shields, and devices both simple and complex as well as the specific patient setup is outlined below.  The patient was given a full consent for superficial radiation to both verbally and in writing and the full determination of patient's eligibility for treatment and selection is outlined on the patient eligibility and treatment selection form.  The specific superficial radiotherapy prescription was determined and was documented on the superficial radiotherapy prescription form.  A treatment calculation was also performed and documented on the treatment calculation form.  Based on the prescription, the patient was scheduled for a series of fractional treatments.
Total Number Of Fractions: 17
Daily Fractionated Dose (Optional- Include Units): 321.6 cGy
Fractions / Week: 2
Date Of Fraction 7: 12/05/2024
Custom Shielding Preamble Text Will Not Be Included With Simple Simulations (.......... X X Y Cm): A lead shield of 0.762 mm thickness is utilized to form a molded, custom shield with a
Bill For Simulation: Yes - (Simple Simulation: 38336)
Date Of Fraction 13: 12/30/2024
Body Location Override (Optional): Nasal Dorsum
Treatment Time / Fractionation (Optional- Include Units): 731 cGy/min
Intro Statement (Will Not Render If Left Blank): The patient is undergoing superficial radiation therapy for skin cancer and presents for weekly evaluation and management.  Per protocol and as documented on the flow sheet, the patient was questioned as to subjective redness, pruritus, pain, drainage, fatigue, or any other symptoms.  Objectively, the radiation area was evaluated with regards to erythema, atrophy, scale, crusting, erosion, ulceration, edema, purpura, tenderness, warmth, drainage, and any other findings.  The plan was extensively reviewed including the dose, and dosing schedule.  The simulation and clinical setup was also reviewed as was the external and any internal shields and based on this review the appropriateness and sufficiency of treatment was determined.
Other Assessment (Will Overide Current Assessment): Appropriate for SRT
Date Of Fraction 9: 12/12/2024
Information: Selecting Yes will display possible errors in your note based on the variables you have selected. This validation is only offered as a suggestion for you. PLEASE NOTE THAT THE VALIDATION TEXT WILL BE REMOVED WHEN YOU FINALIZE YOUR NOTE. IF YOU WANT TO FAX A PRELIMINARY NOTE YOU WILL NEED TO TOGGLE THIS TO 'NO' IF YOU DO NOT WANT IT IN YOUR FAXED NOTE.
Functional Status: 0 (fully active)
Shielding Size (Optional- Include Units): x cm
Energy Output In Cgy/Min (Optional): 731
Ssd In Cm (Optional): 15
Date Of Fraction 6: 12/03/2024

## 2025-01-13 NOTE — PROCEDURE: ADDITIONAL NOTES
Detail Level: Generalized
Additional Notes: Today is patient's final SRT given Sensus TDF calculated to be > 95 indicating patient does not require an 18th dose of radiation.
Render Risk Assessment In Note?: no
Detail Level: Simple
Additional Notes: Medicare - I, Dr. Fernando Tran, attest that my ACGME accredited resident/fellow physician at the [facility] residency program and myself personally met with the patient face to face and examined the patient and I developed the plan of care. As the  and primary teaching physician which involves residents in providing care at [facility], I am able to provide the necessary direction, management and review for the resident’s services by direct supervision with the resident and the patient. Incident to services are being performed under the direct supervision of Dr Tran, initiation and continued involvement in treatment is always followed and he is immediately available as he is within the walls of this office during treatment to provide assistance and direction throughout the time the resident or fellow is performing services.